# Patient Record
Sex: MALE | Race: WHITE | NOT HISPANIC OR LATINO | Employment: OTHER | URBAN - METROPOLITAN AREA
[De-identification: names, ages, dates, MRNs, and addresses within clinical notes are randomized per-mention and may not be internally consistent; named-entity substitution may affect disease eponyms.]

---

## 2021-07-07 ENCOUNTER — OFFICE VISIT (OUTPATIENT)
Dept: URGENT CARE | Facility: URGENT CARE | Age: 86
End: 2021-07-07
Payer: MEDICARE

## 2021-07-07 VITALS
HEART RATE: 46 BPM | TEMPERATURE: 99.6 F | RESPIRATION RATE: 16 BRPM | WEIGHT: 171 LBS | SYSTOLIC BLOOD PRESSURE: 152 MMHG | OXYGEN SATURATION: 97 % | DIASTOLIC BLOOD PRESSURE: 64 MMHG

## 2021-07-07 DIAGNOSIS — L03.032 PARONYCHIA OF TOE, LEFT: Primary | ICD-10-CM

## 2021-07-07 PROCEDURE — 99213 OFFICE O/P EST LOW 20 MIN: CPT | Mod: 25 | Performed by: PHYSICIAN ASSISTANT

## 2021-07-07 PROCEDURE — 11719 TRIM NAIL(S) ANY NUMBER: CPT | Mod: TA | Performed by: PHYSICIAN ASSISTANT

## 2021-07-07 RX ORDER — BIMATOPROST 0.1 MG/ML
1 SOLUTION/ DROPS OPHTHALMIC AT BEDTIME
COMMUNITY
Start: 2020-12-30

## 2021-07-07 RX ORDER — DORZOLAMIDE HYDROCHLORIDE AND TIMOLOL MALEATE 20; 5 MG/ML; MG/ML
1 SOLUTION/ DROPS OPHTHALMIC 2 TIMES DAILY
COMMUNITY
Start: 2021-01-15

## 2021-07-07 RX ORDER — ROSUVASTATIN CALCIUM 10 MG/1
10 TABLET, COATED ORAL DAILY
COMMUNITY
Start: 2021-03-26 | End: 2022-11-03

## 2021-07-07 RX ORDER — CHLORAL HYDRATE 500 MG
2 CAPSULE ORAL
COMMUNITY
End: 2022-11-03

## 2021-07-07 RX ORDER — AMLODIPINE BESYLATE 5 MG/1
5 TABLET ORAL DAILY
COMMUNITY
Start: 2021-03-26

## 2021-07-07 RX ORDER — BIMATOPROST 0.3 MG/ML
1 SOLUTION/ DROPS OPHTHALMIC
COMMUNITY
Start: 2021-01-15 | End: 2022-11-03

## 2021-07-07 RX ORDER — KETOROLAC TROMETHAMINE 5 MG/ML
SOLUTION OPHTHALMIC
COMMUNITY
Start: 2021-06-17 | End: 2022-11-03

## 2021-07-07 RX ORDER — SULFAMETHOXAZOLE/TRIMETHOPRIM 800-160 MG
1 TABLET ORAL 2 TIMES DAILY
Qty: 14 TABLET | Refills: 0 | Status: SHIPPED | OUTPATIENT
Start: 2021-07-07 | End: 2021-07-14

## 2021-07-07 NOTE — PATIENT INSTRUCTIONS
(L03.032) Paronychia of toe, left  (primary encounter diagnosis)  Comment:   Plan: sulfamethoxazole-trimethoprim (BACTRIM DS)         800-160 MG tablet          Soak foot for 20 minutes in warm soapy water (use only 3-5 drops of the surgical soap provided)  Once daily.  Apply bacitracin and NON stick dressing.      Follow up with PODIATRY for re-check in 2 weeks, sooner should symptoms persist or worsen.        Patient Education     Paronychia of the Finger or Toe  Paronychia is an infection near a fingernail or toenail. It usually occurs when an opening in the cuticle or an ingrown toenail lets bacteria under the skin.   The infection will need to be drained if pus is present. If the infection has been caught early, you may need only antibiotic treatment. Healing will take about 1 to 2 weeks.   Home care  Follow these guidelines when caring for yourself at home:     Clean and soak the toe or finger. Do this 2 times a day for the first 3 days. To do so:  ? Soak your foot or hand in a tub of warm water for 5 minutes. Or hold your toe or finger under a faucet of warm running water for 5 minutes.  ? Clean any crust away with soap and water using a cotton swab.  ? Put antibiotic ointment on the infected area.    Change the dressing daily or any time it gets dirty.    If you were given antibiotics, take them as directed until they are all gone.    If your infection is on a toe, wear comfortable shoes with a lot of toe room. You can also wear open-toed sandals while your toe heals.    You may use over-the-counter medicine (acetaminophen or ibuprofen) to help with pain, unless another medicine was prescribed. If you have chronic liver or kidney disease, talk with your healthcare provider before using these medicines. Also talk with your provider if you've had a stomach ulcer or gastrointestinal bleeding.  Prevention  The following can prevent paronychia:     Don't cut or play with your cuticles at home. A healthy cuticle  maintains a seal between your skin and nail and keeps out infection.    Don't bite your nails.    Don't suck on your thumbs or fingers.  Follow-up care  Follow up with your healthcare provider, or as advised.   When to seek medical advice  Call your healthcare provider right away if any of these occur:     Redness, pain, or swelling of the finger or toe gets worse    You have trouble moving or bending the finger or toe    Red streaks in the skin leading away from the wound    Pus or fluid draining from the nail area    Fever of 100.4 F (38 C) or higher, or as directed by your provider  Thomas last reviewed this educational content on 7/1/2019 2000-2021 [employer]. All rights reserved. This information is not intended as a substitute for professional medical care. Always follow your healthcare professional's instructions.

## 2021-07-07 NOTE — PROGRESS NOTES
Patient presents with:  Toe Pain: Left big toe pain for 2-3 weeks. Feels like something is under the toe.     (L03.032) Paronychia of toe, left  (primary encounter diagnosis)  Comment:   Plan: sulfamethoxazole-trimethoprim (BACTRIM DS)         800-160 MG tablet          Soak foot for 20 minutes in warm soapy water (use only 3-5 drops of the surgical soap provided)  Once daily.  Apply bacitracin and NON stick dressing.      Follow up with PODIATRY for re-check in 2 weeks, sooner should symptoms persist or worsen.      35 minutes spent on charting, reviewing chart, procedure and discussing with patient          SUBJECTIVE:   Suleiman Spain is a 89 year old male who presents with pain at the medial aspect of his left great toenail for the past couple of weeks.  No trauma.  No known fevers.    Area is warm and red however and tender to touch.    Has nails cut at salon.      Past Medical History:   Diagnosis Date     Cervicalgia 9/24/2009         Current Outpatient Medications   Medication Sig Dispense Refill     Multiple Vitamins-Iron (DAILY-WILFREDO/IRON/BETA-CAROTENE) TABS TAKE 1 TABLET BY MOUTH DAILY. (Patient not taking: Reported on 10/19/2020) 30 tablet 7     Social History     Tobacco Use     Smoking status: Never Smoker     Smokeless tobacco: Never Used   Substance Use Topics     Alcohol use: Not on file     Family History   Problem Relation Age of Onset     Diabetes Mother      Diabetes Father          ROS:    10 point ROS of systems including Constitutional, Eyes, Respiratory, Cardiovascular, Gastroenterology, Genitourinary, Integumentary, Muscularskeletal, Psychiatric ,neurological were all negative except for pertinent positives noted in my HPI       OBJECTIVE:  BP (!) 152/64   Pulse (!) 46   Temp 99.6  F (37.6  C) (Oral)   Resp 16   Wt 77.6 kg (171 lb)   SpO2 97%   Physical Exam:  GENERAL APPEARANCE: healthy, alert and no distress  NEURO: Normal strength and tone, sensory exam grossly normal,  normal  speech and mentation  SKIN: erythema at medial aspect of great toe at medial aspect of nail.  Nail is growing slightly into skin.  No drainage.    Left great toe: no bony tenderness    PROCEDURE: foot is soaked in  surgiclense soak, cuticle was pulled back and the medial aspect of the nail was lifted and trimmed.  English anvil was used to elevate and remove ingrown nail piece.    Bacitracin and non stick dressing applied.

## 2022-11-03 ENCOUNTER — APPOINTMENT (OUTPATIENT)
Dept: CT IMAGING | Facility: CLINIC | Age: 87
End: 2022-11-03
Attending: EMERGENCY MEDICINE
Payer: MEDICARE

## 2022-11-03 ENCOUNTER — HOSPITAL ENCOUNTER (OUTPATIENT)
Facility: CLINIC | Age: 87
Setting detail: OBSERVATION
Discharge: HOME | End: 2022-11-04
Attending: PHYSICIAN ASSISTANT | Admitting: HOSPITALIST
Payer: MEDICARE

## 2022-11-03 DIAGNOSIS — R93.0 ABNORMAL HEAD CT: ICD-10-CM

## 2022-11-03 DIAGNOSIS — M25.552 HIP PAIN, LEFT: ICD-10-CM

## 2022-11-03 DIAGNOSIS — R53.81 PHYSICAL DECONDITIONING: Primary | ICD-10-CM

## 2022-11-03 DIAGNOSIS — S09.90XA CLOSED HEAD INJURY, INITIAL ENCOUNTER: ICD-10-CM

## 2022-11-03 LAB
ANION GAP SERPL CALCULATED.3IONS-SCNC: 6 MMOL/L (ref 3–14)
ATRIAL RATE - MUSE: 52 BPM
BASOPHILS # BLD AUTO: 0.1 10E3/UL (ref 0–0.2)
BASOPHILS NFR BLD AUTO: 1 %
BUN SERPL-MCNC: 27 MG/DL (ref 7–30)
CALCIUM SERPL-MCNC: 8.9 MG/DL (ref 8.5–10.1)
CHLORIDE BLD-SCNC: 110 MMOL/L (ref 94–109)
CO2 SERPL-SCNC: 24 MMOL/L (ref 20–32)
CREAT SERPL-MCNC: 0.92 MG/DL (ref 0.66–1.25)
DIASTOLIC BLOOD PRESSURE - MUSE: NORMAL MMHG
EOSINOPHIL # BLD AUTO: 0.2 10E3/UL (ref 0–0.7)
EOSINOPHIL NFR BLD AUTO: 2 %
ERYTHROCYTE [DISTWIDTH] IN BLOOD BY AUTOMATED COUNT: 13.1 % (ref 10–15)
GFR SERPL CREATININE-BSD FRML MDRD: 79 ML/MIN/1.73M2
GLUCOSE BLD-MCNC: 106 MG/DL (ref 70–99)
HCT VFR BLD AUTO: 46.5 % (ref 40–53)
HGB BLD-MCNC: 15.1 G/DL (ref 13.3–17.7)
IMM GRANULOCYTES # BLD: 0 10E3/UL
IMM GRANULOCYTES NFR BLD: 0 %
INR PPP: 1.05 (ref 0.85–1.15)
INTERPRETATION ECG - MUSE: NORMAL
LYMPHOCYTES # BLD AUTO: 1.8 10E3/UL (ref 0.8–5.3)
LYMPHOCYTES NFR BLD AUTO: 19 %
MCH RBC QN AUTO: 30 PG (ref 26.5–33)
MCHC RBC AUTO-ENTMCNC: 32.5 G/DL (ref 31.5–36.5)
MCV RBC AUTO: 92 FL (ref 78–100)
MONOCYTES # BLD AUTO: 1 10E3/UL (ref 0–1.3)
MONOCYTES NFR BLD AUTO: 10 %
NEUTROPHILS # BLD AUTO: 6.4 10E3/UL (ref 1.6–8.3)
NEUTROPHILS NFR BLD AUTO: 68 %
NRBC # BLD AUTO: 0 10E3/UL
NRBC BLD AUTO-RTO: 0 /100
P AXIS - MUSE: 43 DEGREES
PLATELET # BLD AUTO: 222 10E3/UL (ref 150–450)
POTASSIUM BLD-SCNC: 3.8 MMOL/L (ref 3.4–5.3)
PR INTERVAL - MUSE: 174 MS
QRS DURATION - MUSE: 94 MS
QT - MUSE: 438 MS
QTC - MUSE: 407 MS
R AXIS - MUSE: -5 DEGREES
RBC # BLD AUTO: 5.04 10E6/UL (ref 4.4–5.9)
SARS-COV-2 RNA RESP QL NAA+PROBE: NEGATIVE
SODIUM SERPL-SCNC: 140 MMOL/L (ref 133–144)
SYSTOLIC BLOOD PRESSURE - MUSE: NORMAL MMHG
T AXIS - MUSE: 17 DEGREES
VENTRICULAR RATE- MUSE: 52 BPM
WBC # BLD AUTO: 9.5 10E3/UL (ref 4–11)

## 2022-11-03 PROCEDURE — 99220 PR INITIAL OBSERVATION CARE,LEVEL III: CPT | Performed by: HOSPITALIST

## 2022-11-03 PROCEDURE — U0003 INFECTIOUS AGENT DETECTION BY NUCLEIC ACID (DNA OR RNA); SEVERE ACUTE RESPIRATORY SYNDROME CORONAVIRUS 2 (SARS-COV-2) (CORONAVIRUS DISEASE [COVID-19]), AMPLIFIED PROBE TECHNIQUE, MAKING USE OF HIGH THROUGHPUT TECHNOLOGIES AS DESCRIBED BY CMS-2020-01-R: HCPCS | Performed by: PHYSICIAN ASSISTANT

## 2022-11-03 PROCEDURE — G0378 HOSPITAL OBSERVATION PER HR: HCPCS

## 2022-11-03 PROCEDURE — 80048 BASIC METABOLIC PNL TOTAL CA: CPT | Performed by: EMERGENCY MEDICINE

## 2022-11-03 PROCEDURE — 72192 CT PELVIS W/O DYE: CPT | Mod: MA

## 2022-11-03 PROCEDURE — 85610 PROTHROMBIN TIME: CPT | Performed by: EMERGENCY MEDICINE

## 2022-11-03 PROCEDURE — 96374 THER/PROPH/DIAG INJ IV PUSH: CPT

## 2022-11-03 PROCEDURE — 36415 COLL VENOUS BLD VENIPUNCTURE: CPT | Performed by: EMERGENCY MEDICINE

## 2022-11-03 PROCEDURE — C9803 HOPD COVID-19 SPEC COLLECT: HCPCS

## 2022-11-03 PROCEDURE — 99285 EMERGENCY DEPT VISIT HI MDM: CPT | Mod: 25

## 2022-11-03 PROCEDURE — 85025 COMPLETE CBC W/AUTO DIFF WBC: CPT | Performed by: EMERGENCY MEDICINE

## 2022-11-03 PROCEDURE — 93005 ELECTROCARDIOGRAM TRACING: CPT

## 2022-11-03 PROCEDURE — 70450 CT HEAD/BRAIN W/O DYE: CPT | Mod: MA

## 2022-11-03 PROCEDURE — 250N000013 HC RX MED GY IP 250 OP 250 PS 637: Performed by: PHYSICIAN ASSISTANT

## 2022-11-03 PROCEDURE — 96376 TX/PRO/DX INJ SAME DRUG ADON: CPT

## 2022-11-03 PROCEDURE — 250N000011 HC RX IP 250 OP 636: Performed by: PHYSICIAN ASSISTANT

## 2022-11-03 RX ORDER — ROSUVASTATIN CALCIUM 10 MG/1
10 TABLET, COATED ORAL DAILY
COMMUNITY

## 2022-11-03 RX ORDER — ACETAMINOPHEN 325 MG/1
650 TABLET ORAL EVERY 6 HOURS PRN
Status: DISCONTINUED | OUTPATIENT
Start: 2022-11-03 | End: 2022-11-04 | Stop reason: HOSPADM

## 2022-11-03 RX ORDER — NETARSUDIL 0.2 MG/ML
1 SOLUTION/ DROPS OPHTHALMIC; TOPICAL AT BEDTIME
COMMUNITY

## 2022-11-03 RX ORDER — DORZOLAMIDE HYDROCHLORIDE AND TIMOLOL MALEATE 20; 5 MG/ML; MG/ML
1 SOLUTION/ DROPS OPHTHALMIC 2 TIMES DAILY
Status: DISCONTINUED | OUTPATIENT
Start: 2022-11-03 | End: 2022-11-04 | Stop reason: HOSPADM

## 2022-11-03 RX ORDER — LIDOCAINE 40 MG/G
CREAM TOPICAL
Status: DISCONTINUED | OUTPATIENT
Start: 2022-11-03 | End: 2022-11-04 | Stop reason: HOSPADM

## 2022-11-03 RX ORDER — ONDANSETRON 4 MG/1
4 TABLET, ORALLY DISINTEGRATING ORAL EVERY 6 HOURS PRN
Status: DISCONTINUED | OUTPATIENT
Start: 2022-11-03 | End: 2022-11-04 | Stop reason: HOSPADM

## 2022-11-03 RX ORDER — ACETAMINOPHEN 650 MG/1
650 SUPPOSITORY RECTAL EVERY 6 HOURS PRN
Status: DISCONTINUED | OUTPATIENT
Start: 2022-11-03 | End: 2022-11-04 | Stop reason: HOSPADM

## 2022-11-03 RX ORDER — HYDROMORPHONE HYDROCHLORIDE 1 MG/ML
0.3 INJECTION, SOLUTION INTRAMUSCULAR; INTRAVENOUS; SUBCUTANEOUS ONCE
Status: COMPLETED | OUTPATIENT
Start: 2022-11-03 | End: 2022-11-03

## 2022-11-03 RX ORDER — HYDROMORPHONE HYDROCHLORIDE 1 MG/ML
0.2 INJECTION, SOLUTION INTRAMUSCULAR; INTRAVENOUS; SUBCUTANEOUS
Status: DISCONTINUED | OUTPATIENT
Start: 2022-11-03 | End: 2022-11-04 | Stop reason: HOSPADM

## 2022-11-03 RX ORDER — HYDROMORPHONE HYDROCHLORIDE 1 MG/ML
0.2 INJECTION, SOLUTION INTRAMUSCULAR; INTRAVENOUS; SUBCUTANEOUS ONCE
Status: COMPLETED | OUTPATIENT
Start: 2022-11-03 | End: 2022-11-03

## 2022-11-03 RX ORDER — ROSUVASTATIN CALCIUM 10 MG/1
10 TABLET, COATED ORAL DAILY
Status: DISCONTINUED | OUTPATIENT
Start: 2022-11-04 | End: 2022-11-04 | Stop reason: HOSPADM

## 2022-11-03 RX ORDER — ONDANSETRON 2 MG/ML
4 INJECTION INTRAMUSCULAR; INTRAVENOUS EVERY 6 HOURS PRN
Status: DISCONTINUED | OUTPATIENT
Start: 2022-11-03 | End: 2022-11-04 | Stop reason: HOSPADM

## 2022-11-03 RX ORDER — ACETAMINOPHEN 500 MG
1000 TABLET ORAL ONCE
Status: COMPLETED | OUTPATIENT
Start: 2022-11-03 | End: 2022-11-03

## 2022-11-03 RX ORDER — AMLODIPINE BESYLATE 5 MG/1
5 TABLET ORAL DAILY
Status: DISCONTINUED | OUTPATIENT
Start: 2022-11-04 | End: 2022-11-04 | Stop reason: HOSPADM

## 2022-11-03 RX ADMIN — ACETAMINOPHEN 1000 MG: 500 TABLET ORAL at 20:23

## 2022-11-03 RX ADMIN — HYDROMORPHONE HYDROCHLORIDE 0.2 MG: 1 INJECTION, SOLUTION INTRAMUSCULAR; INTRAVENOUS; SUBCUTANEOUS at 22:26

## 2022-11-03 RX ADMIN — HYDROMORPHONE HYDROCHLORIDE 0.3 MG: 1 INJECTION, SOLUTION INTRAMUSCULAR; INTRAVENOUS; SUBCUTANEOUS at 19:27

## 2022-11-03 ASSESSMENT — ENCOUNTER SYMPTOMS
JOINT SWELLING: 1
HEADACHES: 1

## 2022-11-03 ASSESSMENT — ACTIVITIES OF DAILY LIVING (ADL)
ADLS_ACUITY_SCORE: 35
ADLS_ACUITY_SCORE: 35

## 2022-11-03 NOTE — ED TRIAGE NOTES
Pt coming by ambulance from assisted living [facility] in [place], fell today and hit head, no loc, no blood thinners. Pt states he tripped. Pain to lt hip   Triage Assessment     Row Name 11/03/22 5117       Triage Assessment (Adult)    Airway WDL WDL       Respiratory WDL    Respiratory WDL WDL       Skin Circulation/Temperature WDL    Skin Circulation/Temperature WDL WDL       Cardiac WDL    Cardiac WDL WDL       Cognitive/Neuro/Behavioral WDL    Cognitive/Neuro/Behavioral WDL WDL

## 2022-11-03 NOTE — ED NOTES
Rapid Assessment Note    History:   Suleiman Spain is a 89 year old male who presents after a fall at his assisted living around 4:30PM where he hit his head and left hip. Now in the ED, he reports that he is only experiencing left hip pain. Denies head pain, headache, neck pain, or loss of consciousness.    Exam:   General:  Alert, interactive  Cardiovascular:  Well perfused  Lungs:  No respiratory distress, no accessory muscle use  Neuro:  Moving all 4 extremities  Skin:  Warm, dry  Psych:  Normal affect  Musculoskeletal: tenderness of left greater trochanter and left posterior pelvis.    Plan of Care:   I evaluated the patient and developed an initial plan of care. I discussed this plan and explained that I, or one of my partners, would be returning to complete the evaluation.     I, Harshal Beltran, am serving as a scribe to document services personally performed by Disha Boggs MD, based on my observations and the provider's statements to me.    11/3/2022  [employer]    Portions of this medical record were completed by a scribe. UPON MY REVIEW AND AUTHENTICATION BY ELECTRONIC SIGNATURE, this confirms (a) I performed the applicable clinical services, and (b) the record is accurate.

## 2022-11-04 ENCOUNTER — APPOINTMENT (OUTPATIENT)
Dept: PHYSICAL THERAPY | Facility: CLINIC | Age: 87
End: 2022-11-04
Attending: HOSPITALIST
Payer: MEDICARE

## 2022-11-04 VITALS
BODY MASS INDEX: 25.63 KG/M2 | HEART RATE: 52 BPM | DIASTOLIC BLOOD PRESSURE: 71 MMHG | HEIGHT: 67 IN | TEMPERATURE: 97.6 F | OXYGEN SATURATION: 97 % | WEIGHT: 163.3 LBS | SYSTOLIC BLOOD PRESSURE: 149 MMHG | RESPIRATION RATE: 16 BRPM

## 2022-11-04 PROCEDURE — 250N000013 HC RX MED GY IP 250 OP 250 PS 637: Performed by: HOSPITALIST

## 2022-11-04 PROCEDURE — G0378 HOSPITAL OBSERVATION PER HR: HCPCS

## 2022-11-04 PROCEDURE — 97116 GAIT TRAINING THERAPY: CPT | Mod: GP | Performed by: PHYSICAL THERAPIST

## 2022-11-04 PROCEDURE — 97530 THERAPEUTIC ACTIVITIES: CPT | Mod: GP | Performed by: PHYSICAL THERAPIST

## 2022-11-04 PROCEDURE — 97161 PT EVAL LOW COMPLEX 20 MIN: CPT | Mod: GP | Performed by: PHYSICAL THERAPIST

## 2022-11-04 PROCEDURE — 99217 PR OBSERVATION CARE DISCHARGE: CPT | Performed by: INTERNAL MEDICINE

## 2022-11-04 RX ORDER — NALOXONE HYDROCHLORIDE 0.4 MG/ML
0.2 INJECTION, SOLUTION INTRAMUSCULAR; INTRAVENOUS; SUBCUTANEOUS
Status: DISCONTINUED | OUTPATIENT
Start: 2022-11-04 | End: 2022-11-04 | Stop reason: HOSPADM

## 2022-11-04 RX ORDER — NALOXONE HYDROCHLORIDE 0.4 MG/ML
0.4 INJECTION, SOLUTION INTRAMUSCULAR; INTRAVENOUS; SUBCUTANEOUS
Status: DISCONTINUED | OUTPATIENT
Start: 2022-11-04 | End: 2022-11-04 | Stop reason: HOSPADM

## 2022-11-04 RX ORDER — ACETAMINOPHEN 500 MG
500-1000 TABLET ORAL EVERY 6 HOURS PRN
Status: ON HOLD | COMMUNITY
End: 2022-11-04

## 2022-11-04 RX ORDER — ACETAMINOPHEN 500 MG
1000 TABLET ORAL EVERY 6 HOURS PRN
COMMUNITY
Start: 2022-11-04

## 2022-11-04 RX ADMIN — ROSUVASTATIN 10 MG: 10 TABLET, FILM COATED ORAL at 09:22

## 2022-11-04 RX ADMIN — AMLODIPINE BESYLATE 5 MG: 5 TABLET ORAL at 09:22

## 2022-11-04 RX ADMIN — DORZOLAMIDE HYDROCHLORIDE AND TIMOLOL MALEATE 1 DROP: 20; 5 SOLUTION/ DROPS OPHTHALMIC at 09:23

## 2022-11-04 RX ADMIN — DORZOLAMIDE HYDROCHLORIDE AND TIMOLOL MALEATE 1 DROP: 20; 5 SOLUTION/ DROPS OPHTHALMIC at 00:54

## 2022-11-04 RX ADMIN — OXYCODONE HYDROCHLORIDE 2.5 MG: 5 TABLET ORAL at 00:55

## 2022-11-04 ASSESSMENT — ACTIVITIES OF DAILY LIVING (ADL)
ADLS_ACUITY_SCORE: 35
DEPENDENT_IADLS:: TRANSPORTATION;MONEY MANAGEMENT
ADLS_ACUITY_SCORE: 35

## 2022-11-04 NOTE — ED NOTES
[facility]  ED Nurse Handoff Report    ED Chief complaint: Fall, Head Injury, and Hip Pain      ED Diagnosis:   Final diagnoses:   Hip pain, left   Closed head injury, initial encounter   Abnormal head CT       Code Status: Not addressed in ED    Allergies:   Allergies   Allergen Reactions     Aspirin Buffered Unknown and GI Disturbance     Brimonidine Other (See Comments)     stomach ache, head ache, sore muscles  Eye burning       Ibuprofen Unknown     Stomach ulcers and bleeding       Erythromycin Rash     Penicillins Rash       Patient Story: Pt from assisted living, fell today. Unable to ambulate d/t left hip pain. CT shows no fractures. Pt is extremely hard of hearing, even with hearing aids.  Focused Assessment:    Labs Ordered and Resulted from Time of ED Arrival to Time of ED Departure   BASIC METABOLIC PANEL - Abnormal       Result Value    Sodium 140      Potassium 3.8      Chloride 110 (*)     Carbon Dioxide (CO2) 24      Anion Gap 6      Urea Nitrogen 27      Creatinine 0.92      Calcium 8.9      Glucose 106 (*)     GFR Estimate 79     INR - Normal    INR 1.05     COVID-19 VIRUS (CORONAVIRUS) BY PCR - Normal    SARS CoV2 PCR Negative     CBC WITH PLATELETS AND DIFFERENTIAL    WBC Count 9.5      RBC Count 5.04      Hemoglobin 15.1      Hematocrit 46.5      MCV 92      MCH 30.0      MCHC 32.5      RDW 13.1      Platelet Count 222      % Neutrophils 68      % Lymphocytes 19      % Monocytes 10      % Eosinophils 2      % Basophils 1      % Immature Granulocytes 0      NRBCs per 100 WBC 0      Absolute Neutrophils 6.4      Absolute Lymphocytes 1.8      Absolute Monocytes 1.0      Absolute Eosinophils 0.2      Absolute Basophils 0.1      Absolute Immature Granulocytes 0.0      Absolute NRBCs 0.0       CT Pelvis Bone wo Contrast   Final Result   IMPRESSION:   1.  Both hips negative for fracture or CT evidence of avascular necrosis.   2.  Degenerative change of both hips.   3.  Pelvis negative  "for fracture.   4.  Degenerative change at the SI joints and symphysis pubis.   5.  Hemorrhagic blood product within the left gluteal musculature without evidence for organized hematoma.   6.  Intrapelvic contents negative.      Head CT w/o contrast   Preliminary Result   IMPRESSION:   1.  No acute intracranial hemorrhage, extra-axial fluid collection, or mass effect/herniation.   2.  A few small nonspecific hypodense foci, including in the left corona radiata and superior right cerebral hemisphere, concerning for age-indeterminate infarcts. If clinically warranted, MRI may be helpful for further characterization.   3.  Generalized brain atrophy and presumed chronic small vessel ischemic change, as described.            Treatments and/or interventions provided: Tylenol and dilaudid  Patient's response to treatments and/or interventions: Pt resting comfortably at rest.    To be done/followed up on inpatient unit:  see orders    Does this patient have any cognitive concerns?: none noted    Activity level - Baseline/Home:  Independent  Activity Level - Current:   Unknown    Patient's Preferred language: English   Needed?: No    Isolation: None  Infection: Not Applicable  Patient tested for COVID 19 prior to admission: YES  Bariatric?: No    Vital Signs:   Vitals:    11/03/22 1820   BP: 139/60   Pulse: 69   Resp: 20   Temp: 97.9  F (36.6  C)   SpO2: 97%   Weight: 72.6 kg (160 lb)   Height: 1.702 m (5' 7\")       Cardiac Rhythm:     Was the PSS-3 completed:   Yes  What interventions are required if any?               Family Comments: daughter  OBS brochure/video discussed/provided to patient/family: No              Name of person given brochure if not patient:               Relationship to patient:     For the majority of the shift this patient's behavior was Green.   Behavioral interventions performed were none.    ED NURSE PHONE NUMBER: *[phone]         "

## 2022-11-04 NOTE — H&P
[facility]    History and Physical - Hospitalist Service       Date of Admission:  11/3/2022    Assessment & Plan      Suleiman Spain is a 89 year old male with a history of hypertension, hyperlipidemia, diabetes mellitus, GERD, peripheral neuropathy, and stroke who presented to the ER following a fall.  After evaluation in the ER the hospitalist service was contacted to bring him into the hospital for further evaluation and management.    Mechanical fall  Left hip/buttock pain  Tripped and fell at his independent living facility.  Landed on his left hip and hit the left side of his head on a table.  He did not try to get up.  EMS was called and brought him into the ER for evaluation.    [place] to observation.    CT head shows no acute intracranial abnormalities.  Low clinical suspicion that the nonspecific hypodense foci concerning for age-indeterminate infarcts are acute.    CT pelvis negative for acute fracture but does show some hemorrhagic blood products within the left gluteal musculature without evidence for organized hematoma.    Symptomatic management.    PT consult.    Care transitions consult.  Patient's daughter is hopeful that the patient will be able to go back to independent living.  She is interested in learning about ways to increase services or hire additional help to help facilitate this.    Hypertension    Continue PTA amlodipine.    Hyperlipidemia    Continue PTA rosuvastatin.    Diabetes mellitus  Peripheral neuropathy    Diet controlled.    GERD    Not currently on any treatment for this.    Presbycusis    He is very hard of hearing, even with his hearing aids in.    History of stroke    Left thalamus lacunar infarct in June 2012.    COVID-19 PCR negative    Routine admission COVID-19 PCR testing was negative on 11/3/2022.       Diet:   regular diet  DVT Prophylaxis: PCDs  Linder Catheter: Not present  Central Lines: None  Cardiac Monitoring: None  Code Status:    "FULL CODE per discussion with his daughter    Clinically Significant Risk Factors Present on Admission                      # Overweight: Estimated body mass index is 25.06 kg/m  as calculated from the following:    Height as of this encounter: 1.702 m (5' 7\").    Weight as of this encounter: 72.6 kg (160 lb).           Disposition Plan      Expected Discharge Date: 11/04/2022                The patient's care was discussed with the Bedside Nurse, Patient, Patient's Family and ER provider.    Tadeo Mckeon MD  Hospitalist Service  [facility]  Securely message with the Vocera Web Console (learn more here)  Text page via [facility] Paging/Directory         ______________________________________________________________________    Chief Complaint   fall    History is obtained from the patient    History of Present Illness   Suleiman Spain is a 89 year old male with a history of hypertension, hyperlipidemia, diabetes mellitus, GERD, peripheral neuropathy, and stroke who presented to the ER following a fall.  He is very hard of hearing and it is somewhat difficult to get history from him.  He states that he turned and tripped and fell at his independent living facility today.  He landed on his left hip and hit his head on a table.  He denies losing consciousness.  He was unable to get up.  EMS was called and he was brought into the ER for evaluation.    In the ER he was evaluated by Matt Man PA-C.  Vitals were okay as documented in epic.  Labs are fairly unremarkable.  CT head showed no acute intracranial findings, did show findings of age-indeterminate infarcts.  CT pelvis was negative for fracture, did show hemorrhagic blood products within the left gluteal musculature without evidence for organized hematoma.  Due to the pain in his left buttock he was unable to ambulate.  Hospitalist service was contacted to bring him into the hospital for further management.    He states he basically " just tripped and fell.  Denies any symptoms preceding the fall.  No lightheadedness, chest pain, shortness of breath, nausea.  He denies any recent acute medical issues.  No changes to his medications.  No sick contacts.    I have updated his daughter Marvin by phone today.  She states he fell once a couple years ago but has not had any recent falls.  Patient lives with his wife in independent living.  The patient is in favor of moving to assisted living however his wife is quite insistent about staying in independent living.  Patient's wife has dementia and the patient helps her with her medications.    Review of Systems    The 10 point Review of Systems is negative other than noted in the HPI or here.    Past Medical History    I have reviewed this patient's medical history and updated it with pertinent information if needed.   Past Medical History:   Diagnosis Date     Benign essential hypertension      Cervicalgia 09/24/2009     Diabetes mellitus (H)      GERD (gastroesophageal reflux disease)      Hyperlipidemia      Peripheral neuropathy      Presbycusis      Stroke (H)      Past Surgical History   I have reviewed this patient's surgical history and updated it with pertinent information if needed.  Past Surgical History:   Procedure Laterality Date     CATARACT EXTRACTION       LASER SURGERY OF EYE Left     SLT     SCLERAL FISTULIZATION Left      TONSILLECTOMY       Social History   I have reviewed this patient's social history and updated it with pertinent information if needed.  Social History     Tobacco Use     Smoking status: Former     Types: Cigarettes     Smokeless tobacco: Never   Substance Use Topics     Alcohol use: Yes     Family History     No significant family history, including no history of: cataract, glaucoma, or macular degeneration.    Prior to Admission Medications   Prior to Admission Medications   Prescriptions Last Dose Informant Patient Reported? Taking?   amLODIPine (NORVASC) 5 MG  tablet 11/3/2022 at am  Yes Yes   Sig: Take 5 mg by mouth daily   bimatoprost (LUMIGAN) 0.01 % SOLN 11/2/2022 at hs  Yes Yes   Sig: Place 1 drop Into the left eye At Bedtime   dorzolamide-timolol (COSOPT) 2-0.5 % ophthalmic solution 11/3/2022 at am  Yes Yes   Sig: Place 1 drop into both eyes 2 times daily   netarsudil (RHOPRESSA) 0.02 % ophthalmic solution 11/2/2022 at hs  Yes Yes   Sig: Place 1 drop into the right eye At Bedtime   rosuvastatin (CRESTOR) 10 MG tablet 11/3/2022 at am  Yes Yes   Sig: Take 10 mg by mouth daily      Facility-Administered Medications: None     Allergies   Allergies   Allergen Reactions     Aspirin Buffered Unknown and GI Disturbance     Brimonidine Other (See Comments)     stomach ache, head ache, sore muscles  Eye burning       Ibuprofen Unknown     Stomach ulcers and bleeding       Erythromycin Rash     Penicillins Rash     Physical Exam   Vital Signs: Temp: 97.9  F (36.6  C)   BP: 139/60 Pulse: 69   Resp: 20 SpO2: 97 %      Weight: 160 lbs 0 oz    Constitutional: awake, alert, cooperative, no apparent distress, laying in the ER bed, very hard of hearing  Eyes: sclera clear, conjunctiva normal  ENT: oral pharynx with moist mucous membranes  Respiratory: no increased work of breathing, clear to auscultation bilaterally, no crackles or wheezing  Cardiovascular: regular rate and rhythm, normal S1 and S2, no murmur noted  GI: normal bowel sounds, soft, non-distended, non-tender  Skin: warm, dry  Musculoskeletal: no lower extremity pitting edema present, left buttock tender to palpation but no ecchymosis or fluctuance  Neurologic: awake, alert, very hard of hearing, answers questions appropriately when he can hear the questions, moves all extremities, left lower extremity mobility limited by left buttock pain    Data   Data reviewed today: I reviewed all medications, new labs and imaging results over the last 24 hours. I personally reviewed no images or EKG's today.    Recent Labs   Lab  11/03/22  1832   WBC 9.5   HGB 15.1   MCV 92      INR 1.05      POTASSIUM 3.8   CHLORIDE 110*   CO2 24   BUN 27   CR 0.92   ANIONGAP 6   SMILEY 8.9   *     Recent Results (from the past 24 hour(s))   Head CT w/o contrast    Narrative    EXAM: CT HEAD W/O CONTRAST  LOCATION: [facility]  DATE/TIME: 11/3/2022 7:54 PM    INDICATION: Trauma. Check for traumatic bleed or skull fracture, pain after fall.  COMPARISON: MRI brain 6/7/2012.  TECHNIQUE: Routine CT Head without IV contrast. Multiplanar reformats. Dose reduction techniques were used.    FINDINGS:  INTRACRANIAL CONTENTS: The ventricles are normal in size and configuration. New small focus of hypodensity in the left corona radiata (series 3 image 19) concerning for age-indeterminate infarct. There also appears to be a couple of new small foci of   hypoattenuation in the superior right cerebellar hemisphere (series 3 image 11) concerning for possible age-indeterminate infarcts. Small focus of hypoattenuation in the inferior aspect of the right lentiform nucleus may represent a previously seen   mildly prominent dilated perivascular space versus a small age-indeterminate infarct. Elsewhere, mild to moderate scattered patchy nonspecific hypoattenuation in the cerebral white matter, probably due to chronic small vessel ischemic disease. Moderate   generalized brain parenchymal volume loss.    VISUALIZED ORBITS/SINUSES/MASTOIDS: Prior bilateral cataract surgery. Visualized portions of the orbits are otherwise unremarkable. No paranasal sinus mucosal disease. No middle ear or mastoid effusion.    BONES/SOFT TISSUES: No acute abnormality. No displaced calvarial fracture identified. Degenerative changes of the bilateral temporomandibular joints.      Impression    IMPRESSION:  1.  No acute intracranial hemorrhage, extra-axial fluid collection, or mass effect/herniation.  2.  A few small nonspecific hypodense foci, including in the  left corona radiata and superior right cerebral hemisphere, concerning for age-indeterminate infarcts. If clinically warranted, MRI may be helpful for further characterization.  3.  Generalized brain atrophy and presumed chronic small vessel ischemic change, as described.   CT Pelvis Bone wo Contrast    Narrative    EXAM: CT PELVIS BONE WO CONTRAST  LOCATION: [facility]  DATE/TIME: 11/3/2022 7:54 PM    INDICATION: Check for left hip or pelvic fracture, pain left hip after fall.  COMPARISON: None.  TECHNIQUE: CT scan of the pelvis was performed without IV contrast. Multiplanar reformats were obtained. Dose reduction techniques were used.  CONTRAST: None.    FINDINGS:    Both hips are negative for fracture or CT evidence of avascular necrosis. Degenerative change of both hip joints. The pelvis is negative for fracture. Degenerative change at the symphysis pubis and SI joints. Degenerative change lower lumbar spine.   Partial sacralization of a transitional lumbosacral vertebral body.    There is asymmetric enlargement of the left gluteal musculature suggestive of a component of hemorrhagic blood product without evidence for significant organized hematoma. The intrapelvic contents are negative for abnormal mass, free fluid, or   lymphadenopathy.      Impression    IMPRESSION:  1.  Both hips negative for fracture or CT evidence of avascular necrosis.  2.  Degenerative change of both hips.  3.  Pelvis negative for fracture.  4.  Degenerative change at the SI joints and symphysis pubis.  5.  Hemorrhagic blood product within the left gluteal musculature without evidence for organized hematoma.  6.  Intrapelvic contents negative.

## 2022-11-04 NOTE — PLAN OF CARE
Physical Therapy Discharge Summary    Reason for therapy discharge:    Discharged to home with home therapy.    Progress towards therapy goal(s). See goals on Care Plan in [facility] electronic health record for goal details.  Goals partially met.  Barriers to achieving goals:   discharge from facility.    Therapy recommendation(s):    Continued therapy is recommended.  Rationale/Recommendations:  Pt would benefit from continued skilled PT services via HHPT to address deficits and improve IND with safety and functional mobility.

## 2022-11-04 NOTE — PROGRESS NOTES
"   11/04/22 0800   Appointment Info   Signing Clinician's Name / Credentials (PT) Shahbaz Lindsey DPT       Present no   Living Environment   People in Home spouse   Current Living Arrangements independent living facility   Home Accessibility no concerns   Transportation Anticipated family or friend will provide   Living Environment Comments Pt lives in an ILF with his spouse. Per pt, pt's spouse has short-term memory loss and requires assist 24/7. Pt reports no concerns regarding stairs. Pt reports his daughter will pick him up upon discharge and provide assist if needed.   Self-Care   Usual Activity Tolerance good   Current Activity Tolerance moderate   Regular Exercise No   Equipment Currently Used at Home walker, rolling;cane, straight   Fall history within last six months yes   Number of times patient has fallen within last six months 1   Activity/Exercise/Self-Care Comment Pt reports being IND at baseline with all ADLs. Pt reports ambulating w/ a SEC at baseline but does have a FWW if needed. Pt reports being able to ambulate 1/4 mile at baseline. Pt does not drive.   General Information   Onset of Illness/Injury or Date of Surgery 11/04/22   Referring Physician Tadeo Mckeon MD   Patient/Family Therapy Goals Statement (PT) \"To go home\"   Pertinent History of Current Problem (include personal factors and/or comorbidities that impact the POC) Per Chart:   Existing Precautions/Restrictions fall   Weight-Bearing Status - LLE full weight-bearing   Weight-Bearing Status - RLE full weight-bearing   Cognition   Orientation Status (Cognition) oriented x 3   Pain Assessment   Patient Currently in Pain No   Integumentary/Edema   Integumentary/Edema Comments 1+ LLE edema   Posture    Posture Forward head position;Protracted shoulders   Range of Motion (ROM)   Range of Motion ROM is WFL   Strength (Manual Muscle Testing)   Strength (Manual Muscle Testing) Able to perform L SLR;Able to " perform R SLR   Bed Mobility   Comment, (Bed Mobility) Supine>sit w/ CGA   Transfers   Comment, (Transfers) Sit>stand w/ FWW and CGA   Gait/Stairs (Locomotion)   [place] Level (Gait) contact guard   Assistive Device (Gait) walker, front-wheeled   Distance in Feet (Required for LE Total Joints) 175'  (10' eval)   Distance in Feet (Gait) 175'   Comment, (Gait/Stairs) Pt ambulated ~10' w/ FWW and CGA   Balance   Balance Comments Pt able to sit at EOB unsupported without LOB. Pt ambulated w/ FWW and was steady.   Sensory Examination   Sensory Perception patient reports no sensory changes   Clinical Impression   Criteria for Skilled Therapeutic Intervention Yes, treatment indicated   PT Diagnosis (PT) Impaired gait   Influenced by the following impairments Decreased activity tolerance; decreased balance; decreased strength   Functional limitations due to impairments Impaired functional mobility   Clinical Presentation (PT Evaluation Complexity) Stable/Uncomplicated   Clinical Presentation Rationale Clinical Judgement   Clinical Decision Making (Complexity) low complexity   Planned Therapy Interventions (PT) balance training;bed mobility training;gait training;patient/family education;strengthening;transfer training   Risk & Benefits of therapy have been explained care plan/treatment goals reviewed;evaluation/treatment results reviewed;current/potential barriers reviewed;risks/benefits reviewed;participants voiced agreement with care plan;participants included;patient   PT Total Evaluation Time   PT Eval, Low Complexity Minutes (80880) 10   Physical Therapy Goals   PT Frequency 5x/week   PT Predicted Duration/Target Date for Goal Attainment 11/09/22   PT Goals Bed Mobility;Transfers;Gait   PT: Bed Mobility Supervision/stand-by assist;Supine to/from sit   PT: Transfers Supervision/stand-by assist;Sit to/from stand;Assistive device   PT: Gait Supervision/stand-by assist;Assistive device;150 feet   Interventions    Interventions Quick Adds Gait Training;Therapeutic Activity   Therapeutic Activity   Therapeutic Activities: dynamic activities to improve functional performance Minutes (35932) 13   Symptoms Noted During/After Treatment Fatigue;Increased pain   Treatment Detail/Skilled Intervention Greeted pt supine in bed, agreed to PT. VSS on RA throughout session. Pt performed supine>sit w/ CGA. Once in sitting, pt able to scoot self to EOB and sit unsupported without LOB. Pt complaining of L hip pain throughout session. Pt performed sit>stand x 5 w/ FWW and CGA, verbal cues for hand placement. After ambulation, pt returned to chair and performed stand>sit w/ FWW and CGA, verbal cues to descend in a slow, controlled motion. Pt ended session sitting in chair, with all needs met and call light within reach.   Gait Training   Gait Training Minutes (82176) 15   Symptoms Noted During/After Treatment (Gait Training) fatigue;increased pain   Treatment Detail/Skilled Intervention Pt ambulated w/ FWW and CGA. Pt ambulated with decreased gait speed, downward gaze, antalgic gait. heavy reliance on FWW, and steady. Verbal cues for upright gaze and posture, to reduce reliance on FWW, and pacing. PT introduced dynamic balance challenges during ambulation including head turns. Mild path deviations noted but no overt LOB noted. Discussed with pt, recommend pt ambulates with a FWW at discharge and pt in agreement.   PT Discharge Planning   PT Plan Progress gait w/ FWW vs SEC; dynamic balance   PT Discharge Recommendation (DC Rec) home with assist   PT Rationale for DC Rec Pt is below baseline but is moving well. Anticipate at time of discharge pt will be SBA for bed mobility, functional transfers, and gait w/ FWW. Pt reports his daughter can provide assist to pt and pt's spouse if needed. Per pt, pt also is planning on moving into [facility] in the future.   PT Brief overview of current status Supine>sit w/ CGA; sit>stand w/ FWW and CGA; gait w/ FWW  and CGA   Total Session Time   Timed Code Treatment Minutes 28   Total Session Time (sum of timed and untimed services) 38

## 2022-11-04 NOTE — ED PROVIDER NOTES
ED ATTENDING PHYSICIAN NOTE:   I evaluated this patient in conjunction with Matt Man PA-C  I have participated in the care of the patient and personally performed key elements of the history, exam, and medical decision making.      HPI:   Suleiman Spain is a 89 year old male presents with left hip pain after a reported fall.  He reports having a mechanical fall around 1630. He reports tripping and hitting his head as well as his left hip.  No headache, neck pain, chest/abdominal pain, nausea, vomiting, diarrhea or dysuria.  He complains predominately of hip pain predominately with movement/walking.  He took tylenol for pain relief. No history anticoagulation.      EXAM:   General: Alert. Appears uncomfortable. Hard of hearing  Head:  The scalp, face, and head appear normal without trauma  Eyes:  Sclera white; Pupils are equal and round  ENT:    External ears normal.      External nares normal.    Neck:  No midline tenderness or pain with full ROM.  CV:  Rate as above with regular rhythm   No murmur   2/2 radial and dorsal pedal pulses  Resp:  Breath sounds clear and equal bilaterally    Non-labored, no retractions or accessory muscle use  GI:  Abdomen soft, non-tender, non-distended    No rebound tenderness or guarding  MSK:  No midline tenderness or bony step-off    No deformity    Moves all extremities equally and symmetrically other than LLE, reproducible L. Hip/buttock pain, no overlying ecchymosis/erythema/warmth. No L. Knee or ankle tenderness  Skin:  No rash or lesions noted.  Neuro:   No apparent deficit.    Strength 5/5 in all extremities other than LLE. Sensation intact x4.     GCS: 15  Psych:  Normal affect.         CT Pelvis Bone wo Contrast   Final Result   IMPRESSION:   1.  Both hips negative for fracture or CT evidence of avascular necrosis.   2.  Degenerative change of both hips.   3.  Pelvis negative for fracture.   4.  Degenerative change at the SI joints and symphysis pubis.   5.  Hemorrhagic  blood product within the left gluteal musculature without evidence for organized hematoma.   6.  Intrapelvic contents negative.      Head CT w/o contrast   Preliminary Result   IMPRESSION:   1.  No acute intracranial hemorrhage, extra-axial fluid collection, or mass effect/herniation.   2.  A few small nonspecific hypodense foci, including in the left corona radiata and superior right cerebral hemisphere, concerning for age-indeterminate infarcts. If clinically warranted, MRI may be helpful for further characterization.   3.  Generalized brain atrophy and presumed chronic small vessel ischemic change, as described.        Labs Ordered and Resulted from Time of ED Arrival to Time of ED Departure   BASIC METABOLIC PANEL - Abnormal       Result Value    Sodium 140      Potassium 3.8      Chloride 110 (*)     Carbon Dioxide (CO2) 24      Anion Gap 6      Urea Nitrogen 27      Creatinine 0.92      Calcium 8.9      Glucose 106 (*)     GFR Estimate 79     INR - Normal    INR 1.05     CBC WITH PLATELETS AND DIFFERENTIAL    WBC Count 9.5      RBC Count 5.04      Hemoglobin 15.1      Hematocrit 46.5      MCV 92      MCH 30.0      MCHC 32.5      RDW 13.1      Platelet Count 222      % Neutrophils 68      % Lymphocytes 19      % Monocytes 10      % Eosinophils 2      % Basophils 1      % Immature Granulocytes 0      NRBCs per 100 WBC 0      Absolute Neutrophils 6.4      Absolute Lymphocytes 1.8      Absolute Monocytes 1.0      Absolute Eosinophils 0.2      Absolute Basophils 0.1      Absolute Immature Granulocytes 0.0      Absolute NRBCs 0.0     COVID-19 VIRUS (CORONAVIRUS) BY PCR       MEDICAL DECISION MAKING/ASSESSMENT AND PLAN:      Patient is a 89-year-old male presenting status post reported mechanical fall.  He predominately complains of left hip pain on my exam.  His work-up was initiated from triage given prolonged wait times.  CT head unremarkable though nonspecific hypodense foci noted.  He did undergo formal CT  pelvis which is without occult hip fracture though noted hemorrhagic blood products within the left gluteal musculature though no organized hematoma identified.  The remainder patient's trauma exam is unremarkable.  He remained hemodynamically stable though continued to have persistent pain to his left hip despite analgesia provided.  He is to benefit from pain control at this point in time.    DIAGNOSIS:     ICD-10-CM    1. Hip pain, left  M25.552       2. Closed head injury, initial encounter  S09.90XA       3. Abnormal head CT  R93.0           DISPOSITION:   Admitted to hospital       11/3/2022  [facility] EMERGENCY DEPT     Bee Mariano,   11/03/22 2107

## 2022-11-04 NOTE — PROGRESS NOTES
Care Management Discharge Note    Discharge Date: 11/04/2022       Discharge Disposition:      Discharge Services:      Discharge DME:      Discharge Transportation: family or friend will provide    Private pay costs discussed: Not applicable    Patient/Family in Agreement with the Plan:  yes    Handoff Referral Completed: Yes    Additional Information:  Patient is ordered Home PT. Per daughter, [facility] is in the building or they have been familiar with them. Writer sent referral to [facility]. [facility] accepted for Home PT to start next week. They told writer if a RN is needed, they would have a longer delay as they have no nurses available. They can see the orders in Epic and writer did not have to fax over orders.        Naty Small RN

## 2022-11-04 NOTE — DISCHARGE SUMMARY
[facility]  Hospitalist Discharge Summary      Date of Admission:  11/3/2022  Date of Discharge:  11/4/2022  Discharging Provider: Jv Baron MD  Discharge Service: Hospitalist Service    Discharge Diagnoses   Mechanical fall  Left hip/buttock pain  Left Gluteal hematoma    Follow-ups Needed After Discharge   Follow-up Appointments     Follow-up and recommended labs and tests       Follow up with primary care provider, Irvin Baptiste, within 14 days for   hospital follow- up and to discuss ongoing fall prevention. No follow up   labs or test are needed.             Unresulted Labs Ordered in the Past 30 Days of this Admission     No orders found for last 31 day(s).        Discharge Disposition   Discharged to home  Condition at discharge: Stable    Hospital Course   Suleiman Spain is a 89 year old male with a history of hypertension, hyperlipidemia, diabetes mellitus, GERD, peripheral neuropathy, and stroke who presented to the ER following a fall.  After evaluation in the ER the hospitalist service was contacted to bring him into the hospital for further evaluation and management.     Mechanical fall  Left hip/buttock pain  Left gluteal hematoma  Tripped and fell at his independent living facility.  Landed on his left hip and hit the left side of his head on a table.  He did not try to get up.  EMS was called and brought him into the ER for evaluation. CT head shows no acute intracranial abnormalities.  Low clinical suspicion that the nonspecific hypodense foci concerning for age-indeterminate infarcts are acute. CT pelvis negative for acute fracture but does show some hemorrhagic blood products within the left gluteal musculature without evidence for organized hematoma.    Seen by PT and social work with recommendation for home therapy    Daughter will work to get patient and wife into assisted living situation with more assistance    Tylenol 1000 mg q6h PRN pain (will avoid narcotics  and ibuprofen)    Rest, ice and elevation    Home PT ordered with minimal mobility and physical deconditioning with limited movement of <100 feet in current condition    Follow up with PCP within 14 days for post hospital follow up and to discuss continued fall prevention and transition to higher level of care in assisted living as well as oversee therapy    Hypertension    Continue PTA amlodipine.     Hyperlipidemia    Continue PTA rosuvastatin.     Diabetes mellitus  Peripheral neuropathy    Diet controlled.     GERD    Not currently on any treatment for this.     Presbycusis    He is very hard of hearing, even with his hearing aids in.     History of stroke    Left thalamus lacunar infarct in June 2012.    Consultations This Hospital Stay   PHYSICAL THERAPY ADULT IP CONSULT  CARE MANAGEMENT / SOCIAL WORK IP CONSULT    Code Status   Full Code    Time Spent on this Encounter   I, Jv Baron MD, personally saw the patient today and spent greater than 30 minutes discharging this patient.       Jv Baron MD  [facility] EXTENDED RECOVERY AND SHORT STAY  [address] [address]  Phone: [phone]  ______________________________________________________________________    Physical Exam   Vital Signs: Temp: 97.6  F (36.4  C) Temp src: Oral BP: (!) 149/71 Pulse: 52   Resp: 16 SpO2: 97 % O2 Device: None (Room air)    Weight: 163 lbs 4.8 oz  Constitutional: Awake, alert, cooperative, no apparent distress  Respiratory: Clear to auscultation bilaterally, no crackles or wheezing  Cardiovascular: Regular rate and rhythm, normal S1 and S2, and no murmur noted  GI: Normal bowel sounds, soft, non-distended, non-tender  Skin/Integumen: No rashes, no cyanosis, no edema  Musc: tenderness in left gluteal area       Primary Care Physician   Irvin Baptiste    Discharge Orders      Home Care Referral      Reason for your hospital stay    You were admitted for a fall and pain in your left  buttock.  Nothing was broken. The pain is getting better. With limited mobility PT/OT recommended home with home therapy. It was a pleasure for our Hospitalist group and me personally to care for you at [facility].  We wish you a speedy recovery and good health!     Follow-up and recommended labs and tests     Follow up with primary care provider, Irvin Baptiste, within 14 days for hospital follow- up and to discuss ongoing fall prevention. No follow up labs or test are needed.     Activity    Your activity upon discharge: activity as tolerated     Diet    Follow this diet upon discharge: Regular Diet     Significant Results and Procedures   Most Recent 3 CBC's:Recent Labs   Lab Test 11/03/22  1832   WBC 9.5   HGB 15.1   MCV 92        Most Recent 3 BMP's:Recent Labs   Lab Test 11/03/22  1832      POTASSIUM 3.8   CHLORIDE 110*   CO2 24   BUN 27   CR 0.92   ANIONGAP 6   SMILEY 8.9   *     Most Recent 2 LFT's:No lab results found.,   Results for orders placed or performed during the hospital encounter of 11/03/22   Head CT w/o contrast    Narrative    EXAM: CT HEAD W/O CONTRAST  LOCATION: [facility]  DATE/TIME: 11/3/2022 7:54 PM    INDICATION: Trauma. Check for traumatic bleed or skull fracture, pain after fall.  COMPARISON: MRI brain 6/7/2012.  TECHNIQUE: Routine CT Head without IV contrast. Multiplanar reformats. Dose reduction techniques were used.    FINDINGS:  INTRACRANIAL CONTENTS: The ventricles are normal in size and configuration. New small focus of hypodensity in the left corona radiata (series 3 image 19) concerning for age-indeterminate infarct. There also appears to be a couple of new small foci of   hypoattenuation in the superior right cerebellar hemisphere (series 3 image 11) concerning for possible age-indeterminate infarcts. Small focus of hypoattenuation in the inferior aspect of the right lentiform nucleus may represent a previously seen    mildly prominent dilated perivascular space versus a small age-indeterminate infarct. Elsewhere, mild to moderate scattered patchy nonspecific hypoattenuation in the cerebral white matter, probably due to chronic small vessel ischemic disease. Moderate   generalized brain parenchymal volume loss.    VISUALIZED ORBITS/SINUSES/MASTOIDS: Prior bilateral cataract surgery. Visualized portions of the orbits are otherwise unremarkable. No paranasal sinus mucosal disease. No middle ear or mastoid effusion.    BONES/SOFT TISSUES: No acute abnormality. No displaced calvarial fracture identified. Degenerative changes of the bilateral temporomandibular joints.      Impression    IMPRESSION:  1.  No acute intracranial hemorrhage, extra-axial fluid collection, or mass effect/herniation.  2.  A few small nonspecific hypodense foci, including in the left corona radiata and superior right cerebral hemisphere, concerning for age-indeterminate infarcts. If clinically warranted, MRI may be helpful for further characterization.  3.  Generalized brain atrophy and presumed chronic small vessel ischemic change, as described.   CT Pelvis Bone wo Contrast    Narrative    EXAM: CT PELVIS BONE WO CONTRAST  LOCATION: [facility]  DATE/TIME: 11/3/2022 7:54 PM    INDICATION: Check for left hip or pelvic fracture, pain left hip after fall.  COMPARISON: None.  TECHNIQUE: CT scan of the pelvis was performed without IV contrast. Multiplanar reformats were obtained. Dose reduction techniques were used.  CONTRAST: None.    FINDINGS:    Both hips are negative for fracture or CT evidence of avascular necrosis. Degenerative change of both hip joints. The pelvis is negative for fracture. Degenerative change at the symphysis pubis and SI joints. Degenerative change lower lumbar spine.   Partial sacralization of a transitional lumbosacral vertebral body.    There is asymmetric enlargement of the left gluteal musculature suggestive  of a component of hemorrhagic blood product without evidence for significant organized hematoma. The intrapelvic contents are negative for abnormal mass, free fluid, or   lymphadenopathy.      Impression    IMPRESSION:  1.  Both hips negative for fracture or CT evidence of avascular necrosis.  2.  Degenerative change of both hips.  3.  Pelvis negative for fracture.  4.  Degenerative change at the SI joints and symphysis pubis.  5.  Hemorrhagic blood product within the left gluteal musculature without evidence for organized hematoma.  6.  Intrapelvic contents negative.       Discharge Medications   Current Discharge Medication List      CONTINUE these medications which have CHANGED    Details   acetaminophen (TYLENOL) 500 MG tablet Take 2 tablets (1,000 mg) by mouth every 6 hours as needed for pain    Associated Diagnoses: Hip pain, left         CONTINUE these medications which have NOT CHANGED    Details   amLODIPine (NORVASC) 5 MG tablet Take 5 mg by mouth daily      bimatoprost (LUMIGAN) 0.01 % SOLN Place 1 drop Into the left eye At Bedtime      dorzolamide-timolol (COSOPT) 2-0.5 % ophthalmic solution Place 1 drop into both eyes 2 times daily      netarsudil (RHOPRESSA) 0.02 % ophthalmic solution Place 1 drop into the right eye At Bedtime      rosuvastatin (CRESTOR) 10 MG tablet Take 10 mg by mouth daily           Allergies   Allergies   Allergen Reactions     Aspirin Buffered Unknown and GI Disturbance     Brimonidine Other (See Comments)     stomach ache, head ache, sore muscles  Eye burning       Ibuprofen Unknown     Stomach ulcers and bleeding       Erythromycin Rash     Penicillins Rash

## 2022-11-04 NOTE — PHARMACY-ADMISSION MEDICATION HISTORY
Pharmacy Medication History  Admission medication history interview status for the 11/3/2022  admission is complete. See EPIC admission navigator for prior to admission medications     Location of Interview: Patient room  Medication history sources: Patient, Surescripts and Care Everywhere    Significant changes made to the medication list:      In the past week, patient estimated taking medication this percent of the time:n/a  Additional medication history information:   ---  Pt is hard of hearing.  It was difficult to interview patient.  He reported taking 3 pills in the morning but just took the last of 1 today (ran out).  Pt gets all his medications from [facility] and [facility].  I looked at both records and could only find amlodipine and rosuvastatin as po meds.  Pt also reported that he is not taking aspirin.    Medication reconciliation completed by provider prior to medication history? No    Time spent in this activity: 15 minutes    Prior to Admission medications    Medication Sig Last Dose Taking? Auth Provider Long Term End Date   amLODIPine (NORVASC) 5 MG tablet Take 5 mg by mouth daily 11/3/2022 at am Yes Reported, Patient Yes    bimatoprost (LUMIGAN) 0.01 % SOLN Place 1 drop Into the left eye At Bedtime 11/2/2022 at hs Yes Reported, Patient     dorzolamide-timolol (COSOPT) 2-0.5 % ophthalmic solution Place 1 drop into both eyes 2 times daily 11/3/2022 at am Yes Reported, Patient     netarsudil (RHOPRESSA) 0.02 % ophthalmic solution Place 1 drop into the right eye At Bedtime 11/2/2022 at hs Yes Unknown, Entered By History     rosuvastatin (CRESTOR) 10 MG tablet Take 10 mg by mouth daily 11/3/2022 at am Yes Unknown, Entered By History No        The information provided in this note is only as accurate as the sources available at the time of update(s)

## 2022-11-04 NOTE — CONSULTS
Care Management Initial Consult    General Information  Assessment completed with: Patient, Spring  Type of CM/SW Visit: Initial Assessment    Primary Care Provider verified and updated as needed: Yes   Readmission within the last 30 days: no previous admission in last 30 days      Reason for Consult: discharge planning  Advance Care Planning:            Communication Assessment  Patient's communication style: spoken language (English or Bilingual)             Cognitive  Cognitive/Neuro/Behavioral: WDL                      Living Environment:   People in home: spouse  Arliss  Current living Arrangements: independent living facility      Able to return to prior arrangements: yes       Family/Social Support:  Care provided by: self  Provides care for: spouse  Marital Status:   Children, Wife  Arliss       Description of Support System: Supportive         Current Resources:   Patient receiving home care services: No     Community Resources: None  Equipment currently used at home: walker, rolling  Supplies currently used at home:      Employment/Financial:  Employment Status: retired        Financial Concerns: No concerns identified           Lifestyle & Psychosocial Needs:  Social Determinants of Health     Tobacco Use: Medium Risk     Smoking Tobacco Use: Former     Smokeless Tobacco Use: Never     Passive Exposure: Not on file   Alcohol Use: Not on file   Financial Resource Strain: Not on file   Food Insecurity: Not on file   Transportation Needs: Not on file   Physical Activity: Not on file   Stress: Not on file   Social Connections: Not on file   Intimate Partner Violence: Not on file   Depression: Not on file   Housing Stability: Not on file       Functional Status:  Prior to admission patient needed assistance:   Dependent ADLs:: Independent, Ambulation-cane  Dependent IADLs:: Transportation, Money Management       Mental Health Status:  Mental Health Status: No Current Concerns       Chemical Dependency  Status:      None noted          Values/Beliefs:  Spiritual, Cultural Beliefs, Congregational Practices, Values that affect care: no               Additional Information:  Met with patient, daughter and son in law. Daughter would like her parents to move into the [place] part of [place]. The patient is helping his spouse with medication reminders and set up. Daughter wanted advise on how to tell her mother she needs to move. Writer  Told Spring valencia, patient has STM issues as well. Patient and spouse sound like they need more support. Spring Valencia has them on the waiting list of the [facility] . Writer can set up home care for patient if needed.     Naty Small RN

## 2022-11-04 NOTE — ED PROVIDER NOTES
"  History     Chief Complaint:  Fall, Head Injury, and Hip Pain       HPI   Suleiman Spain is a 89 year old male presents ER for evaluation of left hip pain and after tripping and falling at his assisted living facility.  He has a mild headache and hip pain but denies any chest pain, abdominal pain, back pain or other.  He is hard of hearing.  No loss of consciousness.    ROS:  Review of Systems   Musculoskeletal: Positive for joint swelling.   Neurological: Positive for headaches.      All other systems reviewed and are negative.    Allergies:  Aspirin Buffered  Brimonidine  Ibuprofen  Erythromycin  Penicillins     Medications:    amLODIPine (NORVASC) 5 MG tablet  Aspirin Buf,CaCarb-MgCarb-MgO, 81 MG TABS  bimatoprost (LUMIGAN) 0.01 % SOLN  bimatoprost (LUMIGAN) 0.03 % ophthalmic solution  diclofenac (VOLTAREN) 1 % topical gel  dorzolamide-timolol (COSOPT) 2-0.5 % ophthalmic solution  fish oil-omega-3 fatty acids 1000 MG capsule  ketorolac (ACULAR) 0.5 % ophthalmic solution  rosuvastatin (CRESTOR) 10 MG tablet        Past Medical History:    Past Medical History:   Diagnosis Date     Cervicalgia 9/24/2009     Patient Active Problem List   Diagnosis   (none) - all problems resolved or deleted        Past Surgical History:    No past surgical history on file.     Family History:    family history is not on file.    Social History:   reports that he has never smoked. He has never used smokeless tobacco.  PCP: Irvin Baptiste     Physical Exam     Patient Vitals for the past 24 hrs:   BP Temp Pulse Resp SpO2 Height Weight   11/03/22 1820 139/60 97.9  F (36.6  C) 69 20 97 % 1.702 m (5' 7\") 72.6 kg (160 lb)        Physical Exam  General: Well appearing, pleasant male, resting on exam bed  HEENT: No evidence of trauma.  Conjunctive are clear.  Extraocular eye movements intact.  Neck range of motion intact.  Nose and throat clear.  Respiratory: Good breath sounds bilaterally  Cardiovascular: Normal rate and rhythm "   Gastrointestinal: Soft, nontender.   Musculoskeletal: Atraumatic.  No spinal tenderness.  Skin: Exposed skin clear.  Neurologic: Alert.  Psych:  Patient is cooperative, with normal affect.  Left hip: Significant tenderness to his left lateral hip.  No tenderness to the rest of his leg.  Range of motion is limited secondary to pain.  Distally sensation is intact.  Good PT pulse.    Emergency Department Course   ECG:  ECG results from 11/03/22   EKG 12 lead     Value    Systolic Blood Pressure     Diastolic Blood Pressure     Ventricular Rate 52    Atrial Rate 52    MI Interval 174    QRS Duration 94        QTc 407    P Axis 43    R AXIS -5    T Axis 17    Interpretation ECG      Sinus bradycardia  Otherwise normal ECG  When compared with ECG of 19-JUL-1996 22:59,  Nonspecific T wave abnormality has replaced inverted T waves in Inferior leads         Imaging:  CT Pelvis Bone wo Contrast   Final Result   IMPRESSION:   1.  Both hips negative for fracture or CT evidence of avascular necrosis.   2.  Degenerative change of both hips.   3.  Pelvis negative for fracture.   4.  Degenerative change at the SI joints and symphysis pubis.   5.  Hemorrhagic blood product within the left gluteal musculature without evidence for organized hematoma.   6.  Intrapelvic contents negative.      Head CT w/o contrast   Preliminary Result   IMPRESSION:   1.  No acute intracranial hemorrhage, extra-axial fluid collection, or mass effect/herniation.   2.  A few small nonspecific hypodense foci, including in the left corona radiata and superior right cerebral hemisphere, concerning for age-indeterminate infarcts. If clinically warranted, MRI may be helpful for further characterization.   3.  Generalized brain atrophy and presumed chronic small vessel ischemic change, as described.           Laboratory:  Labs Ordered and Resulted from Time of ED Arrival to Time of ED Departure   BASIC METABOLIC PANEL - Abnormal       Result Value     Sodium 140      Potassium 3.8      Chloride 110 (*)     Carbon Dioxide (CO2) 24      Anion Gap 6      Urea Nitrogen 27      Creatinine 0.92      Calcium 8.9      Glucose 106 (*)     GFR Estimate 79     INR - Normal    INR 1.05     CBC WITH PLATELETS AND DIFFERENTIAL    WBC Count 9.5      RBC Count 5.04      Hemoglobin 15.1      Hematocrit 46.5      MCV 92      MCH 30.0      MCHC 32.5      RDW 13.1      Platelet Count 222      % Neutrophils 68      % Lymphocytes 19      % Monocytes 10      % Eosinophils 2      % Basophils 1      % Immature Granulocytes 0      NRBCs per 100 WBC 0      Absolute Neutrophils 6.4      Absolute Lymphocytes 1.8      Absolute Monocytes 1.0      Absolute Eosinophils 0.2      Absolute Basophils 0.1      Absolute Immature Granulocytes 0.0      Absolute NRBCs 0.0     COVID-19 VIRUS (CORONAVIRUS) BY PCR        Interventions:  Medications   HYDROmorphone (PF) (DILAUDID) injection 0.2 mg (has no administration in time range)   HYDROmorphone (PF) (DILAUDID) injection 0.3 mg (0.3 mg Intravenous Given 11/3/22 1927)   acetaminophen (TYLENOL) tablet 1,000 mg (1,000 mg Oral Given 11/3/22 2023)        Impression & Plan      Medical Decision Making:  Suleiman Spain is a 89 year old male presents the ER for evaluation after a nonsyncopal fall where he tripped.  See HPI.  His vitals are unremarkable.  He has significant tenderness to his left lateral hip.  Range of motion is limited secondary to pain.  Distally CMS is intact.  He also hit his head and was complaining of a little bit of a headache.  No other injuries are noted.  CT of his head and pelvis without any acute abnormalities.  Discussed with him and his daughter that there were some age-indeterminate strokes.  He is moving all extremities currently and is not complaining of any neurologic symptoms.  He was given the interventions above.  I talked with his daughter over the phone about his visit today.  He is unable to walk.  He comes from  independent living.  He will need more assistance for activities of daily living.  We will admitted to the hospital.  I staffed the case with Dr. Mariano.  Dr. Mckeon is excepting from the hospital service.  Labs were obtained and reassuring.  EKG unremarkable.    Diagnosis:    ICD-10-CM    1. Hip pain, left  M25.552       2. Closed head injury, initial encounter  S09.90XA       3. Abnormal head CT  R93.0            Discharge Medications:  New Prescriptions    No medications on file        11/3/2022   Matt Man PA-C Cyr, Matthew R, PA-C  11/03/22 2037

## 2023-07-22 ENCOUNTER — HOSPITAL ENCOUNTER (EMERGENCY)
Facility: CLINIC | Age: 88
Discharge: HOME | End: 2023-07-22
Attending: EMERGENCY MEDICINE | Admitting: EMERGENCY MEDICINE
Payer: MEDICARE

## 2023-07-22 ENCOUNTER — APPOINTMENT (OUTPATIENT)
Dept: GENERAL RADIOLOGY | Facility: CLINIC | Age: 88
End: 2023-07-22
Attending: EMERGENCY MEDICINE
Payer: MEDICARE

## 2023-07-22 ENCOUNTER — APPOINTMENT (OUTPATIENT)
Dept: CT IMAGING | Facility: CLINIC | Age: 88
End: 2023-07-22
Attending: EMERGENCY MEDICINE
Payer: MEDICARE

## 2023-07-22 VITALS
HEART RATE: 50 BPM | DIASTOLIC BLOOD PRESSURE: 68 MMHG | TEMPERATURE: 98.3 F | RESPIRATION RATE: 16 BRPM | SYSTOLIC BLOOD PRESSURE: 155 MMHG | OXYGEN SATURATION: 97 %

## 2023-07-22 DIAGNOSIS — M25.552 HIP PAIN, LEFT: ICD-10-CM

## 2023-07-22 PROCEDURE — 72192 CT PELVIS W/O DYE: CPT | Mod: MA

## 2023-07-22 PROCEDURE — 250N000013 HC RX MED GY IP 250 OP 250 PS 637: Performed by: EMERGENCY MEDICINE

## 2023-07-22 PROCEDURE — 73502 X-RAY EXAM HIP UNI 2-3 VIEWS: CPT

## 2023-07-22 PROCEDURE — 99284 EMERGENCY DEPT VISIT MOD MDM: CPT | Mod: 25

## 2023-07-22 RX ORDER — TRAMADOL HYDROCHLORIDE 50 MG/1
50 TABLET ORAL EVERY 6 HOURS PRN
Qty: 10 TABLET | Refills: 0 | Status: ON HOLD | OUTPATIENT
Start: 2023-07-22 | End: 2024-01-01

## 2023-07-22 RX ORDER — TRAMADOL HYDROCHLORIDE 50 MG/1
50 TABLET ORAL ONCE
Status: COMPLETED | OUTPATIENT
Start: 2023-07-22 | End: 2023-07-22

## 2023-07-22 RX ADMIN — TRAMADOL HYDROCHLORIDE 50 MG: 50 TABLET, COATED ORAL at 21:37

## 2023-07-22 ASSESSMENT — ACTIVITIES OF DAILY LIVING (ADL)
ADLS_ACUITY_SCORE: 36
ADLS_ACUITY_SCORE: 37

## 2023-07-22 NOTE — ED NOTES
Bed: ED13  Expected date:   Expected time:   Means of arrival:   Comments:  [address] [address] 89 M spontaneous low back pain hip pain eta 1700

## 2023-07-22 NOTE — ED TRIAGE NOTES
Pt arrives via EMS after 2-3 days of increasing L hip pain that radiates to his groin, pt came in today because of a slight decrease in his mobility, pt has hx of fall 1 year ago w/ L hip injury, pt [place],  ABCD intact

## 2023-07-23 NOTE — ED NOTES
Pt ambulated in hallway with walker, pt states he uses walker at home. Pt tolerated ambulation well, still endorses pain in the L hip. MD notified.

## 2023-07-23 NOTE — ED PROVIDER NOTES
History     Chief Complaint:  Hip Pain       The history is provided by the patient.      Suleiman Spain is a 89 year old male who presents with left hip pain. The patient reports experiencing left hip pain for months prior but notes that the past 2 days has increased in severity and he has not been able to sleep due to pain. He has had a decrease in his mobility causing him to present to the ED.     Independent Historian:   No independent historian     Review of External Notes:  Yes-I reviewed his admission at this hospital in November 2022 when he suffered a fall and had hip pain.    Medications:    Amlodipine   Crestor     Past Medical History:    Hypertension   Diabetes Mellitus   GERD   Hyperlipidemia   Peripheral neuropathy   Presbycusis   Stroke     Past Surgical History:    Cataract extraction   Laser surgery   Scleral fistulization   Tonsillectomy      Physical Exam     Patient Vitals for the past 24 hrs:   BP Temp Temp src Pulse Resp SpO2   07/22/23 1926 -- -- -- -- -- 97 %   07/22/23 1800 (!) 155/68 -- -- 50 -- 98 %   07/22/23 1726 (!) 144/67 -- -- -- -- 98 %   07/22/23 1720 136/69 98.3  F (36.8  C) Oral 50 16 97 %        Physical Exam  Eye:  Pupils are equal, round, and reactive.  Extraocular movements intact.    ENT:  No rhinorrhea.  Moist mucus membranes.  Normal tongue and tonsil.    Cardiac:  Regular rate and rhythm.  No murmurs, gallops, or rubs.    Pulmonary:  Clear to auscultation bilaterally.  No wheezes, rales, or rhonchi.    Abdomen:  Positive bowel sounds.  Abdomen is soft and non-distended, without focal tenderness.    Musculoskeletal:  Normal movement of all extremities without evidence for deficit. Severe pain over the greater trochanter and left lateral hip with pain with range of motion     Skin:  Warm and dry without rashes.    Neurologic:  Non-focal exam without asymmetric weakness or numbness.     Psychiatric:  Normal affect with appropriate interaction with examiner.    Emergency  Department Course     Imaging:  CT Pelvis Bone wo Contrast   Final Result   IMPRESSION:   1.  Pelvis CT negative for acute fracture or joint malalignment.      2.  No acute soft tissue injury identified.      3.  Advanced degenerative disc and facet arthropathy in the lower lumbar spine.      4.  Mild degenerative arthritic changes in both hips.      5.  Moderate chronic atrophy of the pelvic and hip musculature.      XR Pelvis w Hip Left 1 View   Final Result   IMPRESSION: Left hip and pelvis negative for acute fracture. Normal joint alignment. Mild degenerative arthritic joint space narrowing and spurring in both hips, which is symmetric. Moderate advanced degenerative changes in the lower lumbar spine. Mild    degenerative changes in the bilateral SI joints. Transitional lumbosacral anatomy noted.        Report per radiology    Emergency Department Course & Assessments:    Interventions:  Medications   traMADol (ULTRAM) tablet 50 mg (50 mg Oral $Given 7/22/23 2137)        Assessments:  1949 I obtained history and examined the patient as noted above.   2058 I rechecked the patient and explained findings after being informed that the patient passed their road test. We discussed plan for discharge.   2101 I spoke with the patient's daughter regarding patient's presentation and plan for discharge.     Independent Interpretation (X-rays, CTs, rhythm strip):  I reviewed the patient's x-ray with no evidence of acute fracture.     Consultations/Discussion of Management or Tests:  None       Social Determinants of Health affecting care:  None     Disposition:  The patient was discharged to home.     Impression & Plan    CMS Diagnoses: None    Medical Decision Making:  This unfortunate 89-year-old man with a history of hip pain presents to us with increasing hip discomfort.  The patient had a fall in November 2022 for which she was evaluated here.  He notes that he has had hip pain ever since this fall.  It has been worse  over the past 3 to 4 days.  He is able to ambulate, but he notes that the pain is kept him awake the last few nights.  He denies any new trauma or weakness or numbness to the leg.    On my exam, he is resting comfortably on the bed.  He does have generalized discomfort throughout the hip joint and allow some range of motion.  Interestingly, he feels better when he is ambulating with it, able to walk up and down the [place] with a walker without any assistance.  He underwent an x-ray, ordered by triage on his initial assessment.  This study was negative.  With his age and nontraumatic pain, I elected to obtain CT imaging of this area to verify that there was not a pathologic fracture or other lesion.  Fortunately, this study is also negative other than showing advanced arthritis.    At this point, I spoke with his daughter.  She feels as though her father has been doing fairly well despite his independent living and having to take care of of his ailing wife who has Alzheimer's disease.  She feels comfortable picking up and taking him home.  She has no other concerns for his appetite or other medical issues and we will hold off on other imaging or blood work.  I we will prescribe him a limited course of tramadol which he can use at bedtime.  Otherwise, I recommended close orthopedic follow-up to consider possible cortisone injection into the hip.  The patient's questions were answered he is comfortable with plan for discharge.    Diagnosis:    ICD-10-CM    1. Hip pain, left  M25.552            Discharge Medications:  Discharge Medication List as of 7/22/2023  9:43 PM      START taking these medications    Details   traMADol (ULTRAM) 50 MG tablet Take 1 tablet (50 mg) by mouth every 6 hours as needed for severe pain, Disp-10 tablet, R-0, Local Print            Scribe Disclosure:  I, Shana Garcia, am serving as a scribe at 8:10 PM on 7/22/2023 to document services personally performed by Trierweiler, Chad A, MD based on  my observations and the provider's statements to me.    7/22/2023   Trierweiler, Chad A, MD Trierweiler, Chad A, MD  07/23/23 0783

## 2023-07-23 NOTE — ED NOTES
Pt discharged home with family, used walker, tolerated ambulation well. Discharge paperwork reviewed with family and pt, verbalized understanding. Pt left with all belongings.

## 2023-07-23 NOTE — DISCHARGE INSTRUCTIONS
As discussed, there is no evidence for acute fracture or other issue with the hip.  There is significant arthritis.  Suleiman may benefit from seeing an orthopedist and getting a cortisone shot.  Call [facility] tomorrow for an appointment.  Use tylenol for pain and the provided Tramadol at bedtime for severe pain.  Return to the ER for any other emergent concerns.

## 2024-01-01 ENCOUNTER — HOSPITAL ENCOUNTER (OUTPATIENT)
Facility: CLINIC | Age: 89
Discharge: HOME HEALTH | End: 2024-10-16
Attending: INTERNAL MEDICINE | Admitting: INTERNAL MEDICINE
Payer: MEDICARE

## 2024-01-01 ENCOUNTER — MEDICAL CORRESPONDENCE (OUTPATIENT)
Dept: HEALTH INFORMATION MANAGEMENT | Facility: CLINIC | Age: 89
End: 2024-01-01

## 2024-01-01 ENCOUNTER — APPOINTMENT (OUTPATIENT)
Dept: GENERAL RADIOLOGY | Facility: CLINIC | Age: 89
End: 2024-01-01
Attending: INTERNAL MEDICINE

## 2024-01-01 ENCOUNTER — HOSPITAL ENCOUNTER (EMERGENCY)
Facility: CLINIC | Age: 89
Discharge: NURSING, REHAB OR LONG-TERM CARE | End: 2024-10-05
Attending: EMERGENCY MEDICINE | Admitting: EMERGENCY MEDICINE
Payer: MEDICARE

## 2024-01-01 ENCOUNTER — HOSPITAL ENCOUNTER (OUTPATIENT)
Facility: CLINIC | Age: 89
Discharge: NURSING, REHAB OR LONG-TERM CARE | End: 2024-10-11
Attending: HOSPITALIST | Admitting: HOSPITALIST
Payer: MEDICARE

## 2024-01-01 ENCOUNTER — HOSPITAL ENCOUNTER (EMERGENCY)
Facility: CLINIC | Age: 89
Discharge: HOME | End: 2024-10-04
Attending: EMERGENCY MEDICINE | Admitting: EMERGENCY MEDICINE
Payer: MEDICARE

## 2024-01-01 ENCOUNTER — APPOINTMENT (OUTPATIENT)
Dept: SPEECH THERAPY | Facility: CLINIC | Age: 89
End: 2024-01-01
Attending: HOSPITALIST

## 2024-01-01 ENCOUNTER — APPOINTMENT (OUTPATIENT)
Dept: MRI IMAGING | Facility: CLINIC | Age: 89
End: 2024-01-01
Attending: STUDENT IN AN ORGANIZED HEALTH CARE EDUCATION/TRAINING PROGRAM

## 2024-01-01 ENCOUNTER — APPOINTMENT (OUTPATIENT)
Dept: CT IMAGING | Facility: CLINIC | Age: 89
End: 2024-01-01
Attending: EMERGENCY MEDICINE
Payer: MEDICARE

## 2024-01-01 ENCOUNTER — APPOINTMENT (OUTPATIENT)
Dept: SPEECH THERAPY | Facility: CLINIC | Age: 89
End: 2024-01-01

## 2024-01-01 ENCOUNTER — APPOINTMENT (OUTPATIENT)
Dept: PHYSICAL THERAPY | Facility: CLINIC | Age: 89
End: 2024-01-01
Attending: INTERNAL MEDICINE

## 2024-01-01 ENCOUNTER — PATIENT OUTREACH (OUTPATIENT)
Dept: CARE COORDINATION | Facility: CLINIC | Age: 89
End: 2024-01-01
Payer: MEDICARE

## 2024-01-01 VITALS
HEART RATE: 64 BPM | RESPIRATION RATE: 18 BRPM | SYSTOLIC BLOOD PRESSURE: 189 MMHG | DIASTOLIC BLOOD PRESSURE: 84 MMHG | OXYGEN SATURATION: 97 % | TEMPERATURE: 98.5 F

## 2024-01-01 VITALS
SYSTOLIC BLOOD PRESSURE: 132 MMHG | RESPIRATION RATE: 19 BRPM | DIASTOLIC BLOOD PRESSURE: 107 MMHG | TEMPERATURE: 97.6 F | OXYGEN SATURATION: 93 % | HEART RATE: 68 BPM

## 2024-01-01 VITALS
DIASTOLIC BLOOD PRESSURE: 65 MMHG | HEART RATE: 73 BPM | RESPIRATION RATE: 16 BRPM | OXYGEN SATURATION: 94 % | TEMPERATURE: 98 F | SYSTOLIC BLOOD PRESSURE: 134 MMHG

## 2024-01-01 VITALS
DIASTOLIC BLOOD PRESSURE: 75 MMHG | SYSTOLIC BLOOD PRESSURE: 127 MMHG | OXYGEN SATURATION: 92 % | TEMPERATURE: 98 F | RESPIRATION RATE: 18 BRPM | HEART RATE: 80 BPM

## 2024-01-01 DIAGNOSIS — R53.1 GENERALIZED WEAKNESS: ICD-10-CM

## 2024-01-01 DIAGNOSIS — Z51.5 END OF LIFE CARE: Primary | ICD-10-CM

## 2024-01-01 DIAGNOSIS — Z86.59 HISTORY OF DEMENTIA: ICD-10-CM

## 2024-01-01 DIAGNOSIS — R33.9 URINARY RETENTION: ICD-10-CM

## 2024-01-01 DIAGNOSIS — R53.1 WEAKNESS GENERALIZED: ICD-10-CM

## 2024-01-01 LAB
ALBUMIN SERPL BCG-MCNC: 3.3 G/DL (ref 3.5–5.2)
ALBUMIN UR-MCNC: NEGATIVE MG/DL
ALP SERPL-CCNC: 88 U/L (ref 40–150)
ALT SERPL W P-5'-P-CCNC: 11 U/L (ref 0–70)
ANION GAP SERPL CALCULATED.3IONS-SCNC: 9 MMOL/L (ref 7–15)
APPEARANCE UR: CLEAR
AST SERPL W P-5'-P-CCNC: 18 U/L (ref 0–45)
ATRIAL RATE - MUSE: 53 BPM
ATRIAL RATE - MUSE: 78 BPM
BASOPHILS # BLD AUTO: 0.1 10E3/UL (ref 0–0.2)
BASOPHILS NFR BLD AUTO: 1 %
BILIRUB SERPL-MCNC: 0.4 MG/DL
BILIRUB UR QL STRIP: NEGATIVE
BUN SERPL-MCNC: 15.4 MG/DL (ref 8–23)
CALCIUM SERPL-MCNC: 8.1 MG/DL (ref 8.8–10.4)
CHLORIDE SERPL-SCNC: 108 MMOL/L (ref 98–107)
COLOR UR AUTO: NORMAL
CREAT SERPL-MCNC: 0.79 MG/DL (ref 0.67–1.17)
DIASTOLIC BLOOD PRESSURE - MUSE: NORMAL MMHG
DIASTOLIC BLOOD PRESSURE - MUSE: NORMAL MMHG
EGFRCR SERPLBLD CKD-EPI 2021: 83 ML/MIN/1.73M2
EOSINOPHIL # BLD AUTO: 0.5 10E3/UL (ref 0–0.7)
EOSINOPHIL NFR BLD AUTO: 6 %
ERYTHROCYTE [DISTWIDTH] IN BLOOD BY AUTOMATED COUNT: 12.7 % (ref 10–15)
GLUCOSE SERPL-MCNC: 93 MG/DL (ref 70–99)
GLUCOSE UR STRIP-MCNC: NEGATIVE MG/DL
HCO3 SERPL-SCNC: 24 MMOL/L (ref 22–29)
HCT VFR BLD AUTO: 45.8 % (ref 40–53)
HGB BLD-MCNC: 14.8 G/DL (ref 13.3–17.7)
HGB UR QL STRIP: NEGATIVE
IMM GRANULOCYTES # BLD: 0 10E3/UL
IMM GRANULOCYTES NFR BLD: 0 %
INTERPRETATION ECG - MUSE: NORMAL
INTERPRETATION ECG - MUSE: NORMAL
KETONES UR STRIP-MCNC: NEGATIVE MG/DL
LEUKOCYTE ESTERASE UR QL STRIP: NEGATIVE
LYMPHOCYTES # BLD AUTO: 1.9 10E3/UL (ref 0.8–5.3)
LYMPHOCYTES NFR BLD AUTO: 24 %
MCH RBC QN AUTO: 30 PG (ref 26.5–33)
MCHC RBC AUTO-ENTMCNC: 32.3 G/DL (ref 31.5–36.5)
MCV RBC AUTO: 93 FL (ref 78–100)
MONOCYTES # BLD AUTO: 0.9 10E3/UL (ref 0–1.3)
MONOCYTES NFR BLD AUTO: 12 %
NEUTROPHILS # BLD AUTO: 4.6 10E3/UL (ref 1.6–8.3)
NEUTROPHILS NFR BLD AUTO: 57 %
NITRATE UR QL: NEGATIVE
NRBC # BLD AUTO: 0 10E3/UL
NRBC BLD AUTO-RTO: 0 /100
P AXIS - MUSE: 74 DEGREES
P AXIS - MUSE: 80 DEGREES
PH UR STRIP: 7 [PH] (ref 5–7)
PLATELET # BLD AUTO: 200 10E3/UL (ref 150–450)
POTASSIUM SERPL-SCNC: 4 MMOL/L (ref 3.4–5.3)
PR INTERVAL - MUSE: 172 MS
PR INTERVAL - MUSE: 192 MS
PROCALCITONIN SERPL IA-MCNC: 0.1 NG/ML
PROT SERPL-MCNC: 5.6 G/DL (ref 6.4–8.3)
QRS DURATION - MUSE: 90 MS
QRS DURATION - MUSE: 92 MS
QT - MUSE: 392 MS
QT - MUSE: 446 MS
QTC - MUSE: 418 MS
QTC - MUSE: 446 MS
R AXIS - MUSE: -14 DEGREES
R AXIS - MUSE: -26 DEGREES
RBC # BLD AUTO: 4.93 10E6/UL (ref 4.4–5.9)
RBC URINE: <1 /HPF
SODIUM SERPL-SCNC: 141 MMOL/L (ref 135–145)
SP GR UR STRIP: 1.01 (ref 1–1.03)
SYSTOLIC BLOOD PRESSURE - MUSE: NORMAL MMHG
SYSTOLIC BLOOD PRESSURE - MUSE: NORMAL MMHG
T AXIS - MUSE: 20 DEGREES
T AXIS - MUSE: 7 DEGREES
TROPONIN T SERPL HS-MCNC: 14 NG/L
TROPONIN T SERPL HS-MCNC: 15 NG/L
UROBILINOGEN UR STRIP-MCNC: NORMAL MG/DL
VENTRICULAR RATE- MUSE: 53 BPM
VENTRICULAR RATE- MUSE: 78 BPM
WBC # BLD AUTO: 8 10E3/UL (ref 4–11)
WBC URINE: <1 /HPF

## 2024-01-01 PROCEDURE — 96361 HYDRATE IV INFUSION ADD-ON: CPT

## 2024-01-01 PROCEDURE — G0378 HOSPITAL OBSERVATION PER HR: HCPCS

## 2024-01-01 PROCEDURE — 96376 TX/PRO/DX INJ SAME DRUG ADON: CPT

## 2024-01-01 PROCEDURE — 99285 EMERGENCY DEPT VISIT HI MDM: CPT | Mod: 25

## 2024-01-01 PROCEDURE — 84145 PROCALCITONIN (PCT): CPT | Performed by: INTERNAL MEDICINE

## 2024-01-01 PROCEDURE — 51702 INSERT TEMP BLADDER CATH: CPT

## 2024-01-01 PROCEDURE — 101N000002 HC CUSTODIAL CARE DAILY

## 2024-01-01 PROCEDURE — 250N000011 HC RX IP 250 OP 636: Performed by: EMERGENCY MEDICINE

## 2024-01-01 PROCEDURE — 250N000013 HC RX MED GY IP 250 OP 250 PS 637: Performed by: HOSPITALIST

## 2024-01-01 PROCEDURE — 99207 PR NO BILLABLE SERVICE THIS VISIT: CPT | Performed by: INTERNAL MEDICINE

## 2024-01-01 PROCEDURE — 80053 COMPREHEN METABOLIC PANEL: CPT | Performed by: EMERGENCY MEDICINE

## 2024-01-01 PROCEDURE — 96374 THER/PROPH/DIAG INJ IV PUSH: CPT | Mod: 59

## 2024-01-01 PROCEDURE — 97161 PT EVAL LOW COMPLEX 20 MIN: CPT | Mod: GP | Performed by: PHYSICAL THERAPIST

## 2024-01-01 PROCEDURE — 84484 ASSAY OF TROPONIN QUANT: CPT | Mod: 91 | Performed by: EMERGENCY MEDICINE

## 2024-01-01 PROCEDURE — 92526 ORAL FUNCTION THERAPY: CPT | Mod: GN | Performed by: SPEECH-LANGUAGE PATHOLOGIST

## 2024-01-01 PROCEDURE — 97530 THERAPEUTIC ACTIVITIES: CPT | Mod: GP | Performed by: PHYSICAL THERAPIST

## 2024-01-01 PROCEDURE — 99222 1ST HOSP IP/OBS MODERATE 55: CPT | Performed by: HOSPITALIST

## 2024-01-01 PROCEDURE — 99232 SBSQ HOSP IP/OBS MODERATE 35: CPT | Performed by: INTERNAL MEDICINE

## 2024-01-01 PROCEDURE — 81001 URINALYSIS AUTO W/SCOPE: CPT | Performed by: EMERGENCY MEDICINE

## 2024-01-01 PROCEDURE — 85025 COMPLETE CBC W/AUTO DIFF WBC: CPT | Performed by: EMERGENCY MEDICINE

## 2024-01-01 PROCEDURE — 70450 CT HEAD/BRAIN W/O DYE: CPT | Mod: MG

## 2024-01-01 PROCEDURE — 250N000013 HC RX MED GY IP 250 OP 250 PS 637: Performed by: EMERGENCY MEDICINE

## 2024-01-01 PROCEDURE — 250N000011 HC RX IP 250 OP 636: Performed by: HOSPITALIST

## 2024-01-01 PROCEDURE — 93005 ELECTROCARDIOGRAM TRACING: CPT | Mod: 59

## 2024-01-01 PROCEDURE — 250N000013 HC RX MED GY IP 250 OP 250 PS 637: Performed by: INTERNAL MEDICINE

## 2024-01-01 PROCEDURE — 96374 THER/PROPH/DIAG INJ IV PUSH: CPT

## 2024-01-01 PROCEDURE — 258N000003 HC RX IP 258 OP 636: Performed by: STUDENT IN AN ORGANIZED HEALTH CARE EDUCATION/TRAINING PROGRAM

## 2024-01-01 PROCEDURE — 36415 COLL VENOUS BLD VENIPUNCTURE: CPT | Performed by: EMERGENCY MEDICINE

## 2024-01-01 PROCEDURE — 96360 HYDRATION IV INFUSION INIT: CPT

## 2024-01-01 PROCEDURE — 99239 HOSP IP/OBS DSCHRG MGMT >30: CPT | Performed by: INTERNAL MEDICINE

## 2024-01-01 PROCEDURE — 92526 ORAL FUNCTION THERAPY: CPT | Mod: GN

## 2024-01-01 PROCEDURE — 71045 X-RAY EXAM CHEST 1 VIEW: CPT

## 2024-01-01 PROCEDURE — G1010 CDSM STANSON: HCPCS

## 2024-01-01 PROCEDURE — 93005 ELECTROCARDIOGRAM TRACING: CPT

## 2024-01-01 PROCEDURE — 70551 MRI BRAIN STEM W/O DYE: CPT | Mod: MG

## 2024-01-01 PROCEDURE — 99232 SBSQ HOSP IP/OBS MODERATE 35: CPT | Performed by: STUDENT IN AN ORGANIZED HEALTH CARE EDUCATION/TRAINING PROGRAM

## 2024-01-01 PROCEDURE — 92610 EVALUATE SWALLOWING FUNCTION: CPT | Mod: GN | Performed by: REHABILITATION PRACTITIONER

## 2024-01-01 PROCEDURE — 36415 COLL VENOUS BLD VENIPUNCTURE: CPT | Performed by: INTERNAL MEDICINE

## 2024-01-01 PROCEDURE — 258N000003 HC RX IP 258 OP 636: Performed by: HOSPITALIST

## 2024-01-01 PROCEDURE — 99207 PR NO BILLABLE SERVICE THIS VISIT: CPT | Performed by: HOSPITALIST

## 2024-01-01 PROCEDURE — 250N000013 HC RX MED GY IP 250 OP 250 PS 637: Performed by: STUDENT IN AN ORGANIZED HEALTH CARE EDUCATION/TRAINING PROGRAM

## 2024-01-01 RX ORDER — VITAMIN B COMPLEX
25 TABLET ORAL DAILY
Status: DISCONTINUED | OUTPATIENT
Start: 2024-01-01 | End: 2024-01-01 | Stop reason: HOSPADM

## 2024-01-01 RX ORDER — QUETIAPINE FUMARATE 25 MG/1
25 TABLET, FILM COATED ORAL 4 TIMES DAILY PRN
Status: DISCONTINUED | OUTPATIENT
Start: 2024-01-01 | End: 2024-01-01 | Stop reason: HOSPADM

## 2024-01-01 RX ORDER — QUETIAPINE FUMARATE 25 MG/1
25 TABLET, FILM COATED ORAL EVERY 6 HOURS PRN
Status: DISCONTINUED | OUTPATIENT
Start: 2024-01-01 | End: 2024-01-01

## 2024-01-01 RX ORDER — DORZOLAMIDE HYDROCHLORIDE AND TIMOLOL MALEATE 20; 5 MG/ML; MG/ML
1 SOLUTION/ DROPS OPHTHALMIC 2 TIMES DAILY
Status: DISCONTINUED | OUTPATIENT
Start: 2024-01-01 | End: 2024-01-01 | Stop reason: HOSPADM

## 2024-01-01 RX ORDER — RIVASTIGMINE TARTRATE 1.5 MG/1
1.5 CAPSULE ORAL 2 TIMES DAILY
Status: DISCONTINUED | OUTPATIENT
Start: 2024-01-01 | End: 2024-01-01 | Stop reason: HOSPADM

## 2024-01-01 RX ORDER — ACETAMINOPHEN 500 MG
500 TABLET ORAL EVERY 6 HOURS PRN
Qty: 10 TABLET | Refills: 0 | Status: SHIPPED | OUTPATIENT
Start: 2024-01-01

## 2024-01-01 RX ORDER — ASPIRIN 81 MG/1
81 TABLET ORAL WEEKLY
Status: DISCONTINUED | OUTPATIENT
Start: 2024-01-01 | End: 2024-01-01 | Stop reason: HOSPADM

## 2024-01-01 RX ORDER — HALOPERIDOL 5 MG/ML
2 INJECTION INTRAMUSCULAR ONCE
Status: COMPLETED | OUTPATIENT
Start: 2024-01-01 | End: 2024-01-01

## 2024-01-01 RX ORDER — ROSUVASTATIN CALCIUM 10 MG/1
10 TABLET, COATED ORAL DAILY
Status: DISCONTINUED | OUTPATIENT
Start: 2024-01-01 | End: 2024-01-01 | Stop reason: HOSPADM

## 2024-01-01 RX ORDER — CALCIUM CARBONATE 500 MG/1
2 TABLET, CHEWABLE ORAL EVERY 6 HOURS PRN
COMMUNITY

## 2024-01-01 RX ORDER — ASPIRIN 81 MG/1
81 TABLET ORAL DAILY
Status: DISCONTINUED | OUTPATIENT
Start: 2024-01-01 | End: 2024-01-01

## 2024-01-01 RX ORDER — ACETAMINOPHEN 325 MG/1
650 TABLET ORAL EVERY 4 HOURS PRN
Status: DISCONTINUED | OUTPATIENT
Start: 2024-01-01 | End: 2024-01-01 | Stop reason: HOSPADM

## 2024-01-01 RX ORDER — ONDANSETRON 4 MG/1
4 TABLET, ORALLY DISINTEGRATING ORAL EVERY 6 HOURS PRN
Status: DISCONTINUED | OUTPATIENT
Start: 2024-01-01 | End: 2024-01-01 | Stop reason: HOSPADM

## 2024-01-01 RX ORDER — OXYCODONE HYDROCHLORIDE 5 MG/1
5 TABLET ORAL EVERY 6 HOURS PRN
Qty: 12 TABLET | Refills: 0 | Status: SHIPPED | OUTPATIENT
Start: 2024-01-01 | End: 2024-01-01

## 2024-01-01 RX ORDER — ACETAMINOPHEN 500 MG
1000 TABLET ORAL EVERY 6 HOURS PRN
Status: DISCONTINUED | OUTPATIENT
Start: 2024-01-01 | End: 2024-01-01 | Stop reason: HOSPADM

## 2024-01-01 RX ORDER — ONDANSETRON 2 MG/ML
4 INJECTION INTRAMUSCULAR; INTRAVENOUS EVERY 6 HOURS PRN
Status: DISCONTINUED | OUTPATIENT
Start: 2024-01-01 | End: 2024-01-01 | Stop reason: HOSPADM

## 2024-01-01 RX ORDER — CARBOXYMETHYLCELLULOSE SODIUM 5 MG/ML
2 SOLUTION/ DROPS OPHTHALMIC 4 TIMES DAILY PRN
Status: DISCONTINUED | OUTPATIENT
Start: 2024-01-01 | End: 2024-01-01

## 2024-01-01 RX ORDER — ASPIRIN 81 MG/1
81 TABLET ORAL
COMMUNITY

## 2024-01-01 RX ORDER — QUETIAPINE FUMARATE 25 MG/1
25 TABLET, FILM COATED ORAL EVERY 6 HOURS PRN
Status: DISCONTINUED | OUTPATIENT
Start: 2024-01-01 | End: 2024-01-01 | Stop reason: HOSPADM

## 2024-01-01 RX ORDER — SENNOSIDES A AND B 8.6 MG/1
1 TABLET, FILM COATED ORAL 2 TIMES DAILY PRN
Qty: 6 TABLET | Refills: 0 | Status: SHIPPED | OUTPATIENT
Start: 2024-01-01

## 2024-01-01 RX ORDER — CALCIUM CARBONATE 500 MG/1
1000 TABLET, CHEWABLE ORAL EVERY 6 HOURS PRN
Status: DISCONTINUED | OUTPATIENT
Start: 2024-01-01 | End: 2024-01-01 | Stop reason: HOSPADM

## 2024-01-01 RX ORDER — ACETAMINOPHEN 650 MG/1
650 SUPPOSITORY RECTAL EVERY 4 HOURS PRN
Qty: 1 SUPPOSITORY | Refills: 0 | Status: SHIPPED | OUTPATIENT
Start: 2024-01-01

## 2024-01-01 RX ORDER — BISACODYL 10 MG
10 SUPPOSITORY, RECTAL RECTAL DAILY PRN
Status: DISCONTINUED | OUTPATIENT
Start: 2024-01-01 | End: 2024-01-01 | Stop reason: HOSPADM

## 2024-01-01 RX ORDER — HYDRALAZINE HYDROCHLORIDE 20 MG/ML
10 INJECTION INTRAMUSCULAR; INTRAVENOUS EVERY 4 HOURS PRN
Status: DISCONTINUED | OUTPATIENT
Start: 2024-01-01 | End: 2024-01-01 | Stop reason: HOSPADM

## 2024-01-01 RX ORDER — ACETAMINOPHEN 650 MG/1
650 SUPPOSITORY RECTAL EVERY 4 HOURS PRN
Status: DISCONTINUED | OUTPATIENT
Start: 2024-01-01 | End: 2024-01-01 | Stop reason: HOSPADM

## 2024-01-01 RX ORDER — AMLODIPINE BESYLATE 5 MG/1
5 TABLET ORAL DAILY
Status: DISCONTINUED | OUTPATIENT
Start: 2024-01-01 | End: 2024-01-01 | Stop reason: HOSPADM

## 2024-01-01 RX ORDER — LORAZEPAM 0.5 MG/1
0.5 TABLET ORAL EVERY 6 HOURS PRN
Qty: 8 TABLET | Refills: 0 | Status: SHIPPED | OUTPATIENT
Start: 2024-01-01

## 2024-01-01 RX ORDER — RIVASTIGMINE TARTRATE 1.5 MG/1
1.5 CAPSULE ORAL 2 TIMES DAILY
Status: DISCONTINUED | OUTPATIENT
Start: 2024-01-01 | End: 2024-01-01

## 2024-01-01 RX ORDER — DOCUSATE SODIUM 100 MG/1
100 CAPSULE, LIQUID FILLED ORAL 2 TIMES DAILY PRN
Status: DISCONTINUED | OUTPATIENT
Start: 2024-01-01 | End: 2024-01-01 | Stop reason: HOSPADM

## 2024-01-01 RX ORDER — SODIUM CHLORIDE, SODIUM LACTATE, POTASSIUM CHLORIDE, CALCIUM CHLORIDE 600; 310; 30; 20 MG/100ML; MG/100ML; MG/100ML; MG/100ML
INJECTION, SOLUTION INTRAVENOUS CONTINUOUS
Status: DISCONTINUED | OUTPATIENT
Start: 2024-01-01 | End: 2024-01-01

## 2024-01-01 RX ORDER — CARBOXYMETHYLCELLULOSE SODIUM 5 MG/ML
1-2 SOLUTION/ DROPS OPHTHALMIC 4 TIMES DAILY PRN
Status: DISCONTINUED | OUTPATIENT
Start: 2024-01-01 | End: 2024-01-01 | Stop reason: HOSPADM

## 2024-01-01 RX ORDER — MULTIPLE VITAMINS W/ MINERALS TAB 9MG-400MCG
1 TAB ORAL DAILY
Status: DISCONTINUED | OUTPATIENT
Start: 2024-01-01 | End: 2024-01-01 | Stop reason: HOSPADM

## 2024-01-01 RX ORDER — ACETAMINOPHEN 500 MG
500 TABLET ORAL EVERY 6 HOURS PRN
Qty: 10 TABLET | Refills: 0 | Status: SHIPPED | OUTPATIENT
Start: 2024-01-01 | End: 2024-01-01

## 2024-01-01 RX ORDER — SODIUM CHLORIDE 9 MG/ML
INJECTION, SOLUTION INTRAVENOUS ONCE
Status: COMPLETED | OUTPATIENT
Start: 2024-01-01 | End: 2024-01-01

## 2024-01-01 RX ORDER — HALOPERIDOL 5 MG/ML
2 INJECTION INTRAMUSCULAR EVERY 6 HOURS PRN
Status: DISCONTINUED | OUTPATIENT
Start: 2024-01-01 | End: 2024-01-01 | Stop reason: HOSPADM

## 2024-01-01 RX ORDER — ACETAMINOPHEN 325 MG/1
650 TABLET ORAL EVERY 4 HOURS PRN
Status: DISCONTINUED | OUTPATIENT
Start: 2024-01-01 | End: 2024-01-01

## 2024-01-01 RX ORDER — SODIUM CHLORIDE 9 MG/ML
INJECTION, SOLUTION INTRAVENOUS CONTINUOUS
Status: ACTIVE | OUTPATIENT
Start: 2024-01-01 | End: 2024-01-01

## 2024-01-01 RX ORDER — QUETIAPINE FUMARATE 25 MG/1
25 TABLET, FILM COATED ORAL EVERY 6 HOURS PRN
COMMUNITY

## 2024-01-01 RX ORDER — RIVASTIGMINE TARTRATE 1.5 MG/1
1.5 CAPSULE ORAL 2 TIMES DAILY
COMMUNITY

## 2024-01-01 RX ORDER — FAMOTIDINE 20 MG
1 TABLET ORAL DAILY
COMMUNITY

## 2024-01-01 RX ORDER — CARBOXYMETHYLCELLULOSE SODIUM 5 MG/ML
1-2 SOLUTION/ DROPS OPHTHALMIC 4 TIMES DAILY PRN
COMMUNITY

## 2024-01-01 RX ADMIN — HALOPERIDOL LACTATE 2 MG: 5 INJECTION, SOLUTION INTRAMUSCULAR at 03:13

## 2024-01-01 RX ADMIN — ROSUVASTATIN CALCIUM 10 MG: 10 TABLET, FILM COATED ORAL at 08:10

## 2024-01-01 RX ADMIN — QUETIAPINE FUMARATE 25 MG: 25 TABLET ORAL at 16:38

## 2024-01-01 RX ADMIN — ROSUVASTATIN CALCIUM 10 MG: 10 TABLET, FILM COATED ORAL at 10:36

## 2024-01-01 RX ADMIN — NETARSUDIL 1 DROP: 0.2 SOLUTION/ DROPS OPHTHALMIC; TOPICAL at 21:11

## 2024-01-01 RX ADMIN — Medication 1 TABLET: at 09:18

## 2024-01-01 RX ADMIN — RIVASTIGMINE TARTRATE 1.5 MG: 1.5 CAPSULE ORAL at 09:18

## 2024-01-01 RX ADMIN — DORZOLAMIDE HYDROCHLORIDE TIMOLOL MALEATE 1 DROP: 20; 5 SOLUTION/ DROPS OPHTHALMIC at 08:10

## 2024-01-01 RX ADMIN — HALOPERIDOL LACTATE 2 MG: 5 INJECTION, SOLUTION INTRAMUSCULAR at 23:38

## 2024-01-01 RX ADMIN — RIVASTIGMINE TARTRATE 1.5 MG: 1.5 CAPSULE ORAL at 07:45

## 2024-01-01 RX ADMIN — ASPIRIN 81 MG: 81 TABLET, COATED ORAL at 07:57

## 2024-01-01 RX ADMIN — DORZOLAMIDE HYDROCHLORIDE TIMOLOL MALEATE 1 DROP: 20; 5 SOLUTION/ DROPS OPHTHALMIC at 07:47

## 2024-01-01 RX ADMIN — HALOPERIDOL LACTATE 2 MG: 5 INJECTION, SOLUTION INTRAMUSCULAR at 16:49

## 2024-01-01 RX ADMIN — ROSUVASTATIN CALCIUM 10 MG: 10 TABLET, FILM COATED ORAL at 08:47

## 2024-01-01 RX ADMIN — ASPIRIN 81 MG: 81 TABLET, COATED ORAL at 08:47

## 2024-01-01 RX ADMIN — AMLODIPINE BESYLATE 5 MG: 5 TABLET ORAL at 07:46

## 2024-01-01 RX ADMIN — QUETIAPINE FUMARATE 25 MG: 25 TABLET ORAL at 00:41

## 2024-01-01 RX ADMIN — RIVASTIGMINE TARTRATE 1.5 MG: 1.5 CAPSULE ORAL at 20:32

## 2024-01-01 RX ADMIN — ROSUVASTATIN CALCIUM 10 MG: 10 TABLET, FILM COATED ORAL at 08:28

## 2024-01-01 RX ADMIN — DORZOLAMIDE HYDROCHLORIDE TIMOLOL MALEATE 1 DROP: 20; 5 SOLUTION/ DROPS OPHTHALMIC at 10:41

## 2024-01-01 RX ADMIN — AMLODIPINE BESYLATE 5 MG: 5 TABLET ORAL at 09:01

## 2024-01-01 RX ADMIN — QUETIAPINE FUMARATE 25 MG: 25 TABLET ORAL at 08:10

## 2024-01-01 RX ADMIN — ROSUVASTATIN CALCIUM 10 MG: 10 TABLET, FILM COATED ORAL at 12:44

## 2024-01-01 RX ADMIN — RIVASTIGMINE TARTRATE 1.5 MG: 1.5 CAPSULE ORAL at 07:49

## 2024-01-01 RX ADMIN — DORZOLAMIDE HYDROCHLORIDE TIMOLOL MALEATE 1 DROP: 20; 5 SOLUTION/ DROPS OPHTHALMIC at 08:48

## 2024-01-01 RX ADMIN — NETARSUDIL 1 DROP: 0.2 SOLUTION/ DROPS OPHTHALMIC; TOPICAL at 21:50

## 2024-01-01 RX ADMIN — QUETIAPINE FUMARATE 25 MG: 25 TABLET ORAL at 11:40

## 2024-01-01 RX ADMIN — SODIUM CHLORIDE: 9 INJECTION, SOLUTION INTRAVENOUS at 02:01

## 2024-01-01 RX ADMIN — NETARSUDIL 1 DROP: 0.2 SOLUTION/ DROPS OPHTHALMIC; TOPICAL at 21:31

## 2024-01-01 RX ADMIN — QUETIAPINE FUMARATE 25 MG: 25 TABLET ORAL at 02:45

## 2024-01-01 RX ADMIN — DORZOLAMIDE HYDROCHLORIDE TIMOLOL MALEATE 1 DROP: 20; 5 SOLUTION/ DROPS OPHTHALMIC at 08:13

## 2024-01-01 RX ADMIN — AMLODIPINE BESYLATE 5 MG: 5 TABLET ORAL at 07:45

## 2024-01-01 RX ADMIN — DORZOLAMIDE HYDROCHLORIDE TIMOLOL MALEATE 1 DROP: 20; 5 SOLUTION/ DROPS OPHTHALMIC at 21:50

## 2024-01-01 RX ADMIN — QUETIAPINE FUMARATE 25 MG: 25 TABLET ORAL at 06:53

## 2024-01-01 RX ADMIN — DORZOLAMIDE HYDROCHLORIDE TIMOLOL MALEATE 1 DROP: 20; 5 SOLUTION/ DROPS OPHTHALMIC at 20:32

## 2024-01-01 RX ADMIN — HALOPERIDOL LACTATE 2 MG: 5 INJECTION, SOLUTION INTRAMUSCULAR at 02:02

## 2024-01-01 RX ADMIN — BIMATOPROST 1 DROP: 0.1 SOLUTION/ DROPS OPHTHALMIC at 22:37

## 2024-01-01 RX ADMIN — ROSUVASTATIN CALCIUM 10 MG: 10 TABLET, FILM COATED ORAL at 07:49

## 2024-01-01 RX ADMIN — BIMATOPROST 1 DROP: 0.1 SOLUTION/ DROPS OPHTHALMIC at 21:44

## 2024-01-01 RX ADMIN — AMLODIPINE BESYLATE 5 MG: 5 TABLET ORAL at 09:18

## 2024-01-01 RX ADMIN — QUETIAPINE FUMARATE 12.5 MG: 25 TABLET ORAL at 08:07

## 2024-01-01 RX ADMIN — Medication 25 MCG: at 07:45

## 2024-01-01 RX ADMIN — RIVASTIGMINE TARTRATE 1.5 MG: 1.5 CAPSULE ORAL at 08:30

## 2024-01-01 RX ADMIN — DORZOLAMIDE HYDROCHLORIDE TIMOLOL MALEATE 1 DROP: 20; 5 SOLUTION/ DROPS OPHTHALMIC at 09:22

## 2024-01-01 RX ADMIN — BIMATOPROST 1 DROP: 0.1 SOLUTION/ DROPS OPHTHALMIC at 21:42

## 2024-01-01 RX ADMIN — DORZOLAMIDE HYDROCHLORIDE TIMOLOL MALEATE 1 DROP: 20; 5 SOLUTION/ DROPS OPHTHALMIC at 20:14

## 2024-01-01 RX ADMIN — AMLODIPINE BESYLATE 5 MG: 5 TABLET ORAL at 10:36

## 2024-01-01 RX ADMIN — RIVASTIGMINE TARTRATE 1.5 MG: 1.5 CAPSULE ORAL at 12:43

## 2024-01-01 RX ADMIN — DORZOLAMIDE HYDROCHLORIDE TIMOLOL MALEATE 1 DROP: 20; 5 SOLUTION/ DROPS OPHTHALMIC at 20:38

## 2024-01-01 RX ADMIN — HALOPERIDOL LACTATE 2 MG: 5 INJECTION, SOLUTION INTRAMUSCULAR at 18:11

## 2024-01-01 RX ADMIN — DORZOLAMIDE HYDROCHLORIDE TIMOLOL MALEATE 1 DROP: 20; 5 SOLUTION/ DROPS OPHTHALMIC at 12:43

## 2024-01-01 RX ADMIN — Medication 25 MCG: at 10:36

## 2024-01-01 RX ADMIN — DORZOLAMIDE HYDROCHLORIDE TIMOLOL MALEATE 1 DROP: 20; 5 SOLUTION/ DROPS OPHTHALMIC at 19:35

## 2024-01-01 RX ADMIN — ROSUVASTATIN CALCIUM 10 MG: 10 TABLET, FILM COATED ORAL at 09:18

## 2024-01-01 RX ADMIN — Medication 25 MCG: at 07:49

## 2024-01-01 RX ADMIN — DORZOLAMIDE HYDROCHLORIDE TIMOLOL MALEATE 1 DROP: 20; 5 SOLUTION/ DROPS OPHTHALMIC at 09:01

## 2024-01-01 RX ADMIN — Medication 1 TABLET: at 10:35

## 2024-01-01 RX ADMIN — BIMATOPROST 1 DROP: 0.1 SOLUTION/ DROPS OPHTHALMIC at 21:11

## 2024-01-01 RX ADMIN — ROSUVASTATIN CALCIUM 10 MG: 10 TABLET, FILM COATED ORAL at 09:00

## 2024-01-01 RX ADMIN — DORZOLAMIDE HYDROCHLORIDE TIMOLOL MALEATE 1 DROP: 20; 5 SOLUTION/ DROPS OPHTHALMIC at 20:57

## 2024-01-01 RX ADMIN — RIVASTIGMINE TARTRATE 1.5 MG: 1.5 CAPSULE ORAL at 19:44

## 2024-01-01 RX ADMIN — BIMATOPROST 1 DROP: 0.1 SOLUTION/ DROPS OPHTHALMIC at 21:31

## 2024-01-01 RX ADMIN — SODIUM CHLORIDE: 9 INJECTION, SOLUTION INTRAVENOUS at 16:15

## 2024-01-01 RX ADMIN — RIVASTIGMINE TARTRATE 1.5 MG: 1.5 CAPSULE ORAL at 20:33

## 2024-01-01 RX ADMIN — NETARSUDIL 1 DROP: 0.2 SOLUTION/ DROPS OPHTHALMIC; TOPICAL at 21:43

## 2024-01-01 RX ADMIN — BIMATOPROST 1 DROP: 0.1 SOLUTION/ DROPS OPHTHALMIC at 21:50

## 2024-01-01 RX ADMIN — Medication 1 TABLET: at 07:49

## 2024-01-01 RX ADMIN — DORZOLAMIDE HYDROCHLORIDE TIMOLOL MALEATE 1 DROP: 20; 5 SOLUTION/ DROPS OPHTHALMIC at 07:49

## 2024-01-01 RX ADMIN — AMLODIPINE BESYLATE 5 MG: 5 TABLET ORAL at 08:10

## 2024-01-01 RX ADMIN — Medication 1 TABLET: at 07:45

## 2024-01-01 RX ADMIN — AMLODIPINE BESYLATE 5 MG: 5 TABLET ORAL at 11:40

## 2024-01-01 RX ADMIN — AMLODIPINE BESYLATE 5 MG: 5 TABLET ORAL at 07:49

## 2024-01-01 RX ADMIN — Medication 25 MCG: at 08:28

## 2024-01-01 RX ADMIN — HALOPERIDOL LACTATE 2 MG: 5 INJECTION, SOLUTION INTRAMUSCULAR at 17:24

## 2024-01-01 RX ADMIN — ROSUVASTATIN CALCIUM 10 MG: 10 TABLET, FILM COATED ORAL at 07:45

## 2024-01-01 RX ADMIN — AMLODIPINE BESYLATE 5 MG: 5 TABLET ORAL at 08:47

## 2024-01-01 RX ADMIN — HALOPERIDOL LACTATE 2 MG: 5 INJECTION, SOLUTION INTRAMUSCULAR at 17:23

## 2024-01-01 RX ADMIN — DORZOLAMIDE HYDROCHLORIDE TIMOLOL MALEATE 1 DROP: 20; 5 SOLUTION/ DROPS OPHTHALMIC at 20:33

## 2024-01-01 RX ADMIN — NETARSUDIL 1 DROP: 0.2 SOLUTION/ DROPS OPHTHALMIC; TOPICAL at 22:27

## 2024-01-01 RX ADMIN — QUETIAPINE FUMARATE 25 MG: 25 TABLET ORAL at 01:56

## 2024-01-01 RX ADMIN — QUETIAPINE FUMARATE 25 MG: 25 TABLET ORAL at 21:51

## 2024-01-01 RX ADMIN — QUETIAPINE FUMARATE 25 MG: 25 TABLET ORAL at 07:46

## 2024-01-01 RX ADMIN — HALOPERIDOL LACTATE 2 MG: 5 INJECTION, SOLUTION INTRAMUSCULAR at 05:44

## 2024-01-01 RX ADMIN — RIVASTIGMINE TARTRATE 1.5 MG: 1.5 CAPSULE ORAL at 10:35

## 2024-01-01 RX ADMIN — DORZOLAMIDE HYDROCHLORIDE TIMOLOL MALEATE 1 DROP: 20; 5 SOLUTION/ DROPS OPHTHALMIC at 21:10

## 2024-01-01 RX ADMIN — BIMATOPROST 1 DROP: 0.1 SOLUTION/ DROPS OPHTHALMIC at 21:48

## 2024-01-01 RX ADMIN — ACETAMINOPHEN 650 MG: 325 TABLET ORAL at 20:54

## 2024-01-01 RX ADMIN — NETARSUDIL 1 DROP: 0.2 SOLUTION/ DROPS OPHTHALMIC; TOPICAL at 21:48

## 2024-01-01 RX ADMIN — DORZOLAMIDE HYDROCHLORIDE TIMOLOL MALEATE 1 DROP: 20; 5 SOLUTION/ DROPS OPHTHALMIC at 07:45

## 2024-01-01 RX ADMIN — ROSUVASTATIN CALCIUM 10 MG: 10 TABLET, FILM COATED ORAL at 07:46

## 2024-01-01 RX ADMIN — BIMATOPROST 1 DROP: 0.1 SOLUTION/ DROPS OPHTHALMIC at 22:27

## 2024-01-01 RX ADMIN — Medication 25 MCG: at 09:18

## 2024-01-01 RX ADMIN — Medication 1 TABLET: at 08:28

## 2024-01-01 RX ADMIN — RIVASTIGMINE TARTRATE 1.5 MG: 1.5 CAPSULE ORAL at 21:34

## 2024-01-01 RX ADMIN — NETARSUDIL 1 DROP: 0.2 SOLUTION/ DROPS OPHTHALMIC; TOPICAL at 22:37

## 2024-01-01 RX ADMIN — QUETIAPINE FUMARATE 12.5 MG: 25 TABLET ORAL at 03:59

## 2024-01-01 RX ADMIN — DORZOLAMIDE HYDROCHLORIDE TIMOLOL MALEATE 1 DROP: 20; 5 SOLUTION/ DROPS OPHTHALMIC at 21:42

## 2024-01-01 RX ADMIN — AMLODIPINE BESYLATE 5 MG: 5 TABLET ORAL at 08:28

## 2024-01-01 ASSESSMENT — ACTIVITIES OF DAILY LIVING (ADL)
ADLS_ACUITY_SCORE: 71
ADLS_ACUITY_SCORE: 66
ADLS_ACUITY_SCORE: 65
ADLS_ACUITY_SCORE: 69
ADLS_ACUITY_SCORE: 70
ADLS_ACUITY_SCORE: 73
ADLS_ACUITY_SCORE: 67
ADLS_ACUITY_SCORE: 65
ADLS_ACUITY_SCORE: 39
ADLS_ACUITY_SCORE: 38
ADLS_ACUITY_SCORE: 70
ADLS_ACUITY_SCORE: 49
ADLS_ACUITY_SCORE: 38
ADLS_ACUITY_SCORE: 38
ADLS_ACUITY_SCORE: 73
ADLS_ACUITY_SCORE: 66
ADLS_ACUITY_SCORE: 65
ADLS_ACUITY_SCORE: 65
ADLS_ACUITY_SCORE: 69
ADLS_ACUITY_SCORE: 38
ADLS_ACUITY_SCORE: 39
ADLS_ACUITY_SCORE: 68
ADLS_ACUITY_SCORE: 67
ADLS_ACUITY_SCORE: 65
ADLS_ACUITY_SCORE: 73
ADLS_ACUITY_SCORE: 66
ADLS_ACUITY_SCORE: 69
ADLS_ACUITY_SCORE: 73
ADLS_ACUITY_SCORE: 71
ADLS_ACUITY_SCORE: 73
ADLS_ACUITY_SCORE: 66
ADLS_ACUITY_SCORE: 73
ADLS_ACUITY_SCORE: 70
ADLS_ACUITY_SCORE: 66
ADLS_ACUITY_SCORE: 51
ADLS_ACUITY_SCORE: 71
ADLS_ACUITY_SCORE: 69
ADLS_ACUITY_SCORE: 65
ADLS_ACUITY_SCORE: 65
ADLS_ACUITY_SCORE: 66
ADLS_ACUITY_SCORE: 71
ADLS_ACUITY_SCORE: 65
ADLS_ACUITY_SCORE: 66
ADLS_ACUITY_SCORE: 71
ADLS_ACUITY_SCORE: 69
ADLS_ACUITY_SCORE: 70
ADLS_ACUITY_SCORE: 69
ADLS_ACUITY_SCORE: 65
ADLS_ACUITY_SCORE: 39
ADLS_ACUITY_SCORE: 65
ADLS_ACUITY_SCORE: 69
ADLS_ACUITY_SCORE: 73
ADLS_ACUITY_SCORE: 73
ADLS_ACUITY_SCORE: 70
ADLS_ACUITY_SCORE: 69
ADLS_ACUITY_SCORE: 70
ADLS_ACUITY_SCORE: 70
ADLS_ACUITY_SCORE: 69
ADLS_ACUITY_SCORE: 69
ADLS_ACUITY_SCORE: 71
ADLS_ACUITY_SCORE: 51
ADLS_ACUITY_SCORE: 73
ADLS_ACUITY_SCORE: 71
ADLS_ACUITY_SCORE: 67
ADLS_ACUITY_SCORE: 70
ADLS_ACUITY_SCORE: 67
ADLS_ACUITY_SCORE: 71
ADLS_ACUITY_SCORE: 73
ADLS_ACUITY_SCORE: 38
ADLS_ACUITY_SCORE: 69
ADLS_ACUITY_SCORE: 69
ADLS_ACUITY_SCORE: 67
ADLS_ACUITY_SCORE: 69
ADLS_ACUITY_SCORE: 65
ADLS_ACUITY_SCORE: 73
ADLS_ACUITY_SCORE: 73
ADLS_ACUITY_SCORE: 69
ADLS_ACUITY_SCORE: 69
ADLS_ACUITY_SCORE: 65
ADLS_ACUITY_SCORE: 65
ADLS_ACUITY_SCORE: 67
ADLS_ACUITY_SCORE: 73
ADLS_ACUITY_SCORE: 69
ADLS_ACUITY_SCORE: 69
ADLS_ACUITY_SCORE: 38
ADLS_ACUITY_SCORE: 69
ADLS_ACUITY_SCORE: 73
ADLS_ACUITY_SCORE: 69
ADLS_ACUITY_SCORE: 66
ADLS_ACUITY_SCORE: 69
ADLS_ACUITY_SCORE: 39
ADLS_ACUITY_SCORE: 70
ADLS_ACUITY_SCORE: 73
ADLS_ACUITY_SCORE: 38
ADLS_ACUITY_SCORE: 71
ADLS_ACUITY_SCORE: 38
ADLS_ACUITY_SCORE: 68
ADLS_ACUITY_SCORE: 70
ADLS_ACUITY_SCORE: 38
ADLS_ACUITY_SCORE: 38
ADLS_ACUITY_SCORE: 51
ADLS_ACUITY_SCORE: 65
ADLS_ACUITY_SCORE: 66
ADLS_ACUITY_SCORE: 70
ADLS_ACUITY_SCORE: 69
ADLS_ACUITY_SCORE: 70
ADLS_ACUITY_SCORE: 73
ADLS_ACUITY_SCORE: 38
ADLS_ACUITY_SCORE: 67
ADLS_ACUITY_SCORE: 66
ADLS_ACUITY_SCORE: 69
ADLS_ACUITY_SCORE: 71
ADLS_ACUITY_SCORE: 70
ADLS_ACUITY_SCORE: 67
ADLS_ACUITY_SCORE: 67
ADLS_ACUITY_SCORE: 73
ADLS_ACUITY_SCORE: 70
ADLS_ACUITY_SCORE: 69
ADLS_ACUITY_SCORE: 71
ADLS_ACUITY_SCORE: 38
ADLS_ACUITY_SCORE: 66
ADLS_ACUITY_SCORE: 55
ADLS_ACUITY_SCORE: 73
ADLS_ACUITY_SCORE: 69
ADLS_ACUITY_SCORE: 71
ADLS_ACUITY_SCORE: 73
ADLS_ACUITY_SCORE: 73
ADLS_ACUITY_SCORE: 69
ADLS_ACUITY_SCORE: 69
ADLS_ACUITY_SCORE: 73
ADLS_ACUITY_SCORE: 65
ADLS_ACUITY_SCORE: 69
ADLS_ACUITY_SCORE: 38
ADLS_ACUITY_SCORE: 66
ADLS_ACUITY_SCORE: 67
ADLS_ACUITY_SCORE: 69
ADLS_ACUITY_SCORE: 65
ADLS_ACUITY_SCORE: 69
ADLS_ACUITY_SCORE: 38
ADLS_ACUITY_SCORE: 73
ADLS_ACUITY_SCORE: 70
ADLS_ACUITY_SCORE: 38
ADLS_ACUITY_SCORE: 65
ADLS_ACUITY_SCORE: 73
ADLS_ACUITY_SCORE: 66
ADLS_ACUITY_SCORE: 73
ADLS_ACUITY_SCORE: 66
ADLS_ACUITY_SCORE: 65
ADLS_ACUITY_SCORE: 73
ADLS_ACUITY_SCORE: 69
ADLS_ACUITY_SCORE: 73
ADLS_ACUITY_SCORE: 38
ADLS_ACUITY_SCORE: 38
ADLS_ACUITY_SCORE: 66
ADLS_ACUITY_SCORE: 73
ADLS_ACUITY_SCORE: 38
ADLS_ACUITY_SCORE: 71
ADLS_ACUITY_SCORE: 38
ADLS_ACUITY_SCORE: 69
ADLS_ACUITY_SCORE: 70
ADLS_ACUITY_SCORE: 65
ADLS_ACUITY_SCORE: 73
ADLS_ACUITY_SCORE: 39
ADLS_ACUITY_SCORE: 70
ADLS_ACUITY_SCORE: 65
ADLS_ACUITY_SCORE: 70
ADLS_ACUITY_SCORE: 38
ADLS_ACUITY_SCORE: 65
ADLS_ACUITY_SCORE: 38
ADLS_ACUITY_SCORE: 71
ADLS_ACUITY_SCORE: 51
ADLS_ACUITY_SCORE: 69
ADLS_ACUITY_SCORE: 69
ADLS_ACUITY_SCORE: 73
ADLS_ACUITY_SCORE: 73
ADLS_ACUITY_SCORE: 66
ADLS_ACUITY_SCORE: 71
ADLS_ACUITY_SCORE: 38
ADLS_ACUITY_SCORE: 66
ADLS_ACUITY_SCORE: 39
ADLS_ACUITY_SCORE: 71
ADLS_ACUITY_SCORE: 73
ADLS_ACUITY_SCORE: 69
ADLS_ACUITY_SCORE: 67
ADLS_ACUITY_SCORE: 67
ADLS_ACUITY_SCORE: 71
ADLS_ACUITY_SCORE: 51
ADLS_ACUITY_SCORE: 70
ADLS_ACUITY_SCORE: 66
ADLS_ACUITY_SCORE: 38
ADLS_ACUITY_SCORE: 69
ADLS_ACUITY_SCORE: 71
ADLS_ACUITY_SCORE: 71
ADLS_ACUITY_SCORE: 38
ADLS_ACUITY_SCORE: 65
ADLS_ACUITY_SCORE: 67
ADLS_ACUITY_SCORE: 66
ADLS_ACUITY_SCORE: 38
ADLS_ACUITY_SCORE: 39
ADLS_ACUITY_SCORE: 73
ADLS_ACUITY_SCORE: 73
ADLS_ACUITY_SCORE: 71
ADLS_ACUITY_SCORE: 71
ADLS_ACUITY_SCORE: 69
ADLS_ACUITY_SCORE: 65
ADLS_ACUITY_SCORE: 38
ADLS_ACUITY_SCORE: 69
ADLS_ACUITY_SCORE: 38
ADLS_ACUITY_SCORE: 73
ADLS_ACUITY_SCORE: 73
ADLS_ACUITY_SCORE: 69
ADLS_ACUITY_SCORE: 73
ADLS_ACUITY_SCORE: 73
ADLS_ACUITY_SCORE: 69
ADLS_ACUITY_SCORE: 65
ADLS_ACUITY_SCORE: 66
ADLS_ACUITY_SCORE: 69
ADLS_ACUITY_SCORE: 69
ADLS_ACUITY_SCORE: 73
ADLS_ACUITY_SCORE: 69
ADLS_ACUITY_SCORE: 39
ADLS_ACUITY_SCORE: 73
ADLS_ACUITY_SCORE: 73
ADLS_ACUITY_SCORE: 71
ADLS_ACUITY_SCORE: 73
ADLS_ACUITY_SCORE: 73
ADLS_ACUITY_SCORE: 65
ADLS_ACUITY_SCORE: 38
ADLS_ACUITY_SCORE: 69
ADLS_ACUITY_SCORE: 73
ADLS_ACUITY_SCORE: 69
ADLS_ACUITY_SCORE: 73
ADLS_ACUITY_SCORE: 73
ADLS_ACUITY_SCORE: 69
ADLS_ACUITY_SCORE: 73
ADLS_ACUITY_SCORE: 70
ADLS_ACUITY_SCORE: 69
ADLS_ACUITY_SCORE: 71
ADLS_ACUITY_SCORE: 70
ADLS_ACUITY_SCORE: 69
ADLS_ACUITY_SCORE: 71
ADLS_ACUITY_SCORE: 73
ADLS_ACUITY_SCORE: 70
ADLS_ACUITY_SCORE: 71
ADLS_ACUITY_SCORE: 69
ADLS_ACUITY_SCORE: 69
ADLS_ACUITY_SCORE: 73
ADLS_ACUITY_SCORE: 69
ADLS_ACUITY_SCORE: 38
ADLS_ACUITY_SCORE: 65
ADLS_ACUITY_SCORE: 69
ADLS_ACUITY_SCORE: 73
ADLS_ACUITY_SCORE: 38
ADLS_ACUITY_SCORE: 73
ADLS_ACUITY_SCORE: 70
ADLS_ACUITY_SCORE: 73
ADLS_ACUITY_SCORE: 71
ADLS_ACUITY_SCORE: 38
ADLS_ACUITY_SCORE: 69
ADLS_ACUITY_SCORE: 69
ADLS_ACUITY_SCORE: 67
ADLS_ACUITY_SCORE: 67
ADLS_ACUITY_SCORE: 38

## 2024-01-01 ASSESSMENT — COLUMBIA-SUICIDE SEVERITY RATING SCALE - C-SSRS: IS THE PATIENT NOT ABLE TO COMPLETE C-SSRS: UNABLE TO VERBALIZE

## 2024-10-04 NOTE — ED TRIAGE NOTES
[facility], pt found on bed c/o gen weakness, EMS noted HR of 42 bpm, 2 mg of atropine given, HR increased to 55 bpm, EMS noted 1st degree AV block on EKG, ABCD intact.

## 2024-10-04 NOTE — ED NOTES
Spoke with Nissa Nursing Staff for patient at [facility] - RN went over discharge instructions with Nissa.

## 2024-10-04 NOTE — ED NOTES
Pt requires sitter at this time. Charge nurse requested sitter via ANS and staffing. Curtain left open, yellow fall prevention socks placed, side rails up, bed in lowest position with wheels locked, personal items within arms reach, bed/chair alarm placed until sitter available.

## 2024-10-04 NOTE — ED NOTES
Bed: ED05  Expected date:   Expected time:   Means of arrival:   Comments:  431 EMS: 89M heart block here now

## 2024-10-04 NOTE — ED PROVIDER NOTES
Emergency Department Note      History of Present Illness     Chief Complaint   Bradycardia and Generalized Weakness      HPI   Suleiman Spain is a 89 year old male with history of type 2 diabetes mellitus, dementia, and stroke who presents to the ED via EMS for evaluation of bradycardia and generalized weakness. Patient is a poor historian due to [place] and blindness. EMS reports the patient was found by staff in bed at 1100 complaining of generalized weakness. EMS noted bradycardia at 42 bpm and possible 1st degree heart block on 12-lead. EMS administered 1 mg atropine which improved his rate to 60's and then it settled in the high 50's. He received another 1 mg atropine without changes.  systolic, 98% on RA, and . 18G in the left AC. On ED arrival, patient denies any weakness and states he does not feel much different from baseline. He denies dizziness or nausea. He does endorse a dry mouth. Patient is [place] bilaterally and blind in his left eye.    Independent Historian   EMS as detailed above.    Review of External Notes       Past Medical History     Medical History and Problem List   Type 2 diabetes mellitus with diabetic neuropathy  Severe cognitive impairment  Dementia  Hyperlipidemia  Hypertension  GERD  Cervicalgia  Stroke  Presbycusis    Medications   Amlodipine  Aspirin 81 mg  Rivastigmine  Rosuvastatin    Surgical History   Cataract extraction  Scleral fistulization, left  Tonsillectomy    Physical Exam     Patient Vitals for the past 24 hrs:   BP Temp Temp src Pulse Resp SpO2   10/04/24 1300 (!) 144/71 97.6  F (36.4  C) Oral 52 16 96 %   10/04/24 1245 (!) 144/64 -- -- 50 19 94 %   10/04/24 1230 (!) 140/99 -- -- 51 18 99 %     Physical Exam  General: Resting on the ED bed.  Patient is very hard of hearing.    He notes that he is functionally blind in the left eye.  Head:  The scalp, face, and head appear normal  Eyes:  The pupils are equal, round, and reactive to light    There is no  nystagmus    Extraocular muscles are intact    Conjunctivae and sclerae are normal  ENT:    The nose is normal    Pinnae are normal    The oropharynx is normal  Neck:  Normal range of motion    There is no rigidity noted  CV:  The patient has a bradycardic rate in the low 50s.  This is sinus bradycardia.  There is no evidence of first-degree, second-degree, or third-degree heart block.  There is no significant ectopy.  It appears that this is a chronic situation for this patient.  Normal underlying rhythm     Normal S1/S2    No pathological murmur detected  Resp:  Lungs are clear    There is no tachypnea    Non-labored    No rales    No wheezing   GI:  Abdomen is soft, there is no rigidity    No distension/tympani    No rebound tenderness     Non-surgical without peritoneal features at this time    There is no areas of focal abdominal tenderness.  MS:  Normal muscular tone    Symmetric motor strength    No major joint effusions    No asymmetric leg swelling    No calf tenderness    There is no significant swelling in the extremities at this time.  Skin:  No rash or acute skin lesions noted  Neuro:  Speech is normal and fluent, there is no aphasia    No motor deficits    Cranial nerves are intact  Psych: Awake. Alert.      Normal affect  Appropriate interactions            Diagnostics     Lab Results   Labs Ordered and Resulted from Time of ED Arrival to Time of ED Departure   COMPREHENSIVE METABOLIC PANEL - Abnormal       Result Value    Sodium 141      Potassium 4.0      Carbon Dioxide (CO2) 24      Anion Gap 9      Urea Nitrogen 15.4      Creatinine 0.79      GFR Estimate 83      Calcium 8.1 (*)     Chloride 108 (*)     Glucose 93      Alkaline Phosphatase 88      AST 18      ALT 11      Protein Total 5.6 (*)     Albumin 3.3 (*)     Bilirubin Total 0.4     TROPONIN T, HIGH SENSITIVITY - Normal    Troponin T, High Sensitivity 14     CBC WITH PLATELETS AND DIFFERENTIAL    WBC Count 8.0      RBC Count 4.93       Hemoglobin 14.8      Hematocrit 45.8      MCV 93      MCH 30.0      MCHC 32.3      RDW 12.7      Platelet Count 200      % Neutrophils 57      % Lymphocytes 24      % Monocytes 12      % Eosinophils 6      % Basophils 1      % Immature Granulocytes 0      NRBCs per 100 WBC 0      Absolute Neutrophils 4.6      Absolute Lymphocytes 1.9      Absolute Monocytes 0.9      Absolute Eosinophils 0.5      Absolute Basophils 0.1      Absolute Immature Granulocytes 0.0      Absolute NRBCs 0.0     ROUTINE UA WITH MICROSCOPIC REFLEX TO CULTURE   TROPONIN T, HIGH SENSITIVITY       Imaging   No orders to display       EKG   ECG taken at 1218, ECG read at 1226  Sinus bradycardia  Otherwise normal ECG   No significant change as compared to prior, dated 11/3/2022.  Rate 53 bpm. NJ interval 192 ms. QRS duration 90 ms. QT/QTc 446/418 ms. P-R-T axes 80 -14 7.    Independent Interpretation   None    ED Course      Medications Administered   Medications - No data to display    Procedures   Procedures     Discussion of Management   None    ED Course   ED Course as of 10/04/24 1323   Fri Oct 04, 2024   1222 I obtained history and examined the patient as noted above.   3:56 PM  Spoke with the patient's son Sonali.  He confirmed that the patient's hearing aids are currently in the shop.  He also confirms that he recently had some testing performed and he did not perform well on that and the diagnosis is likely consistent with dementia.  He and his sister are trying to get the patient into memory care where the patient's wife currently resides at the [facility] center in [place].      Additional Documentation  None    Medical Decision Making / Diagnosis     CMS Diagnoses: None    MIPS       None    MDM   Suleiman Spain is a 89 year old male was sent to the emergency department for generalized weakness.  They noticed that his heart rate was in the 40s and thought he should come to the emergency department for an assessment.  The patient does not  take any beta-blockers or calcium channel blockers systemically orally.  He does use a beta-blocker eyedrop.  The patient is not having any dizziness, or hypotension.  The patient has no complaints here in the emergency department.  His twelve-lead EKG is normal, there is no evidence of any type of heart block.  His twelve-lead electrocardiogram is similar to that obtained in 2022.  Given the patient's normal physical exam (with the exception of severe sensorineural hearing loss and dementia), and normal screening laboratories, he is safe to return back to the care facility.  The patient clearly has worsening and severe cognitive decline, and may even be experiencing some dementia based hallucinations.  The nurse saw him a few times pointing toward the door and looking around.  It is difficult to know because the patient also has blindness in the left eye.  I discussed this with the family and we will send him back to the care facility and they will continue working on trying to get him into memory/dementia care.  There is no indication for hospital admission at this time.    The patient did have a transient episode where he could not urinate and was noted to have 500 cc of urinary retention, a Linder catheter was placed and drained out 600 cc of urine.  There is no evidence of infection.  The patient did not want the catheter to remain and was pulling at it so we did remove it.      Disposition   The patient was discharged.     Diagnosis     ICD-10-CM    1. Weakness generalized  R53.1       2. Urinary retention  R33.9       3. History of dementia  Z86.59              Scribe Disclosure:  I, Tereza Kelloggmaria g, am serving as a scribe at 1:24 PM on 10/4/2024 to document services personally performed by Young Puckett MD based on my observations and the provider's statements to me.        Young Puckett MD  10/04/24 5402       Young Puckett MD  10/04/24 9548

## 2024-10-04 NOTE — ED NOTES
Per Son Sonali pt in Memory care room 217 at [facility] - Son okay with [facility] stretcher back to facility

## 2024-10-04 NOTE — DISCHARGE INSTRUCTIONS
Maintain a normal level of hydration.  Continued to eat meals normally.  No life-threatening etiologies or causes for your generalized weakness were detected in the emergency department.

## 2024-10-05 PROBLEM — R53.1 GENERALIZED WEAKNESS: Status: ACTIVE | Noted: 2024-01-01

## 2024-10-05 NOTE — ED PROVIDER NOTES
Emergency Department Note      History of Present Illness     Chief Complaint   Generalized Weakness      HPI   Suleiman Spain is a 89 year old male with history of stroke, dementia, hypertension, hyperlipidemia, and type 2 diabetes who presents with generalized weakness. The patient was discharged from the emergency department this evening to his memory care facility, where the staff said that the patient was much weaker than baseline and were uncomfortable keeping him there. EMS personnel were unable to get the care facility to sign to receive the patient, so he was returned to the ED for further evaluation and admission. The patient denies pain anywhere but otherwise provides no history, as he is hard of hearing in both ears.    Independent Historian   EMS as detailed above.    Review of External Notes   I reviewed the primary care visit family medicine note from 9/19/2024.  Patient has had additional episodes of confusion with some agitation and aggression.  A cognitive evaluation which showed a MoCA of 8 out of 30 was consistent with severe cognitive impairment.  He has an appointment with neurology in December.  He had been prescribed Seroquel to use as needed for agitation at his facility.  He had not had any agitation episodes over the previous 10 days he was prescribed Exelon.  An MRI of the brain was ordered.    I reviewed the emergency department visit note from earlier in the day.  Patient was sent to the emergency department with generalized weakness and was noted to be bradycardic per EMS and treated with atropine.  Patient is hard of hearing bilaterally and blind in his left eye.  A thorough and full workup was completed.  Bradycardia was asymptomatic.  Laboratory studies were reassuring.  Urinalysis was bland without signs of infection.  A head CT was negative.  The patient did have an episode of urinary retention that was treated with straight cath x 1.  Family and patient wished to be discharged  home and family was comfortable with this.  Patient was treated with Haldol 2 mg IV at 1650 and at 1725.    Past Medical History     Medical History and Problem List   Central retinal vein occlusion of left eye  GERD  Hypertension  Hyperlipidemia  Pseudophakia (B)  PVD  Type 2 diabetes  Glaucoma (B)  Stroke    Medications   Norvasc  Aspirin 81 mg  Lumigan  Cosopt  Rhopressa  Ultram  Seroquel  Methylprednisolone    Surgical History   Cataract extraction  Scleral fistulization (L)  Tonsillectomy    Physical Exam     Patient Vitals for the past 24 hrs:   BP Temp Temp src Pulse Resp SpO2   10/04/24 2345 (!) 162/89 97.2  F (36.2  C) Temporal 78 26 98 %   10/04/24 2215 (!) 140/70 -- -- 68 24 --   10/04/24 2212 -- 97.5  F (36.4  C) Axillary -- 24 --     Physical Exam  General: Well-nourished, restless in bed  Eyes: PERRL, conjunctivae pink no scleral icterus or conjunctival injection  ENT:  Moist mucus membranes  Respiratory:  Lungs clear to auscultation bilaterally, no respiratory distress  CV: Normal rate and rhythm, no murmurs/rubs/gallops  GI:  Abdomen soft and non-distended.  Normoactive BS.  No tenderness  Skin: Warm, dry.    Neuro: Alert but unable to determine if patient is oriented due to extreme hard of hearing.  Moving all 4 extremities.  No apparent cranial nerve deficit but difficulty in cooperating with exam.  Psychiatric: Agitated affect    Diagnostics     Lab Results   Labs Ordered and Resulted from Time of ED Arrival to Time of ED Departure - No data to display    Imaging   No orders to display       EKG   ECG results from 10/04/24   EKG 12 lead     Value    Systolic Blood Pressure     Diastolic Blood Pressure     Ventricular Rate 78    Atrial Rate 78    MA Interval 172    QRS Duration 92        QTc 446    P Axis 74    R AXIS -26    T Axis 20    Interpretation ECG      Sinus rhythm  Nonspecific ST abnormality  Abnormal ECG  When compared with ECG of 04-Oct-2024 12:18,  No significant change was  found         Independent Interpretation   None    ED Course      Medications Administered   Medications - No data to display      Discussion of Management   0118 I spoke with Dr. Pedroza, hospitalist, regarding the patient's presentation, findings, and plan of care. He does not accept the patient for admission. We will plan on having the patient stay in the ED rather than the hospital and receive social work consultation.    ED Course   ED Course as of 10/05/24 0129   Fri Oct 04, 2024   2342 I obtained history and examined the patient as noted above   Sat Oct 05, 2024   0028 I rechecked the patient and explained findings.   0123 I consulted with Dr. Pedroza of the hospitalist service.  He reports that the patient does not meet criteria for hospitalization.  He reports that the patient would be care home care.  Recommends that we hold the Haldol or use a lower dose if he becomes agitated as this may have contributed to the weakness that he was experiencing when he was discharged back to his care facility.  Patient will remain on board in the emergency department overnight awaiting reassessment and social work consultation in the morning.       Additional Documentation  None    Medical Decision Making / Diagnosis     CMS Diagnoses: None    MIPS       None    MDM   Suleiman Spain is a 89 year old male who was evaluated in the emergency department previously in the day and discharged to his care facility.  Upon arrival, nursing in the care facility sent him back to the emergency department for concern of generalized weakness and reported that they felt uncomfortable.  They reported that they would not sign to receive the patient and so EMS returned the patient to the emergency department.  Full diagnostic workup completed earlier in the day and so no additional imaging or testing was required.  Given the report of bradycardia, an EKG was obtained.  Heart rate here is not bradycardic.  Given history of urinary  retention, bladder scan was obtained.  This was under 500 mL in the intermittent straight cath protocol was ordered.  I did call and leave a message with the patient's contact family member.  It is the middle of the night and we have not received a return call as yet.  I did attempt to admit the patient for generalized weakness and acute encephalopathy as this is apparently different from his baseline status.  Hospitalist service felt that there was no indication for admission and recommended [facility] care.  Given that the patient's care facility will not accept him back, were unable to contact any family members and he cannot be admitted to the hospital, we will observe him in the emergency department overnight with the hopes that he will clear and his care facility will accept him or we can arrange for higher level of care for him.  A social work consult was ordered but this is after hours and will not be completed until tomorrow.    Disposition   The patient was signed out to my partner at the shift change.    Diagnosis     ICD-10-CM    1. Acute encephalopathy  G93.40                  Scribe Disclosure:  I, Edd Pal, am serving as a scribe at 11:47 PM on 10/4/2024 to document services personally performed by Ida Law MD based on my observations and the provider's statements to me.        Ida Law MD  10/05/24 6902

## 2024-10-05 NOTE — ED TRIAGE NOTES
BIBA from [facility]. Pt discharged this evening back to prior living facility and upon arrival, staff felt uncomfortable as they stated patient was weaker than baseline. EMS states pt received haldol prior transport, facility staff reiterated they were uncomfortable and would not sign to receive patient. EMS returned with patient to ED.     Triage Assessment (Adult)       Row Name 10/04/24 2222          Triage Assessment    Airway WDL WDL        Respiratory WDL    Respiratory WDL WDL        Skin Circulation/Temperature WDL    Skin Circulation/Temperature WDL WDL        Cardiac WDL    Cardiac WDL WDL     Cardiac Rhythm NSR        Peripheral/Neurovascular WDL    Peripheral Neurovascular WDL WDL        Cognitive/Neuro/Behavioral WDL    Cognitive/Neuro/Behavioral WDL X     Level of Consciousness confused     Arousal Level opens eyes spontaneously     Orientation disoriented x 4     Speech garbled;illogical     Mood/Behavior anxious;restless        Pupils (CN II)    Pupil Size Left 3 mm     Pupil Size Right 3 mm        Monalisa Coma Scale    Best Eye Response 4-->(E4) spontaneous     Best Motor Response 5-->(M5) localizes pain     Best Verbal Response 4-->(V4) confused     [place] Coma Scale Score 13        Motor Response    LUE Motor Response purposeful/movement localizing     RUE Motor Response purposeful/movement localizing     LLE Motor Response purposeful/movement localizing     RLE Motor Response purposeful/movement localizing

## 2024-10-05 NOTE — PHARMACY-ADMISSION MEDICATION HISTORY
"Pharmacy Intern Admission Medication History    Admission medication history is complete. The information provided in this note is only as accurate as the sources available at the time of the update.    Information Source(s): Facility ([facility]/) medication list/MAR via N/A    Pertinent Information: Medication list verified by MAR from [facility] ([phone]).    Changes made to PTA medication list:  Added:   Tums  Aspirin  Refresh Eye Drops  Multivitamin  Quetiapine  Rivastigime  Vitamin D  Deleted: None  Changed: Marked Tramadol \"not taking\"    Allergies reviewed with patient and updates made in EHR: yes    Medication History Completed By: Taya Triana 10/5/2024 7:56 AM    PTA Med List   Medication Sig Last Dose    acetaminophen (TYLENOL) 500 MG tablet Take 2 tablets (1,000 mg) by mouth every 6 hours as needed for pain  at PRN    amLODIPine (NORVASC) 5 MG tablet Take 5 mg by mouth daily 10/4/2024 at AM    aspirin 81 MG EC tablet Take 81 mg by mouth. ON WEDNESDAYS Unknown    bimatoprost (LUMIGAN) 0.01 % SOLN Place 1 drop Into the left eye At Bedtime 10/3/2024 at PM    calcium carbonate (TUMS) 500 MG chewable tablet Take 2 chew tab by mouth every 6 hours as needed for heartburn.  at PRN    carboxymethylcellulose PF (REFRESH PLUS) 0.5 % ophthalmic solution Place 1-2 drops into both eyes 4 times daily as needed for dry eyes.  at PRN    dorzolamide-timolol (COSOPT) 2-0.5 % ophthalmic solution Place 1 drop into both eyes 2 times daily 10/4/2024 at AM    Multiple Vitamins-Minerals (MULTIVITAMIN MEN 50+) TABS Take 1 tablet by mouth daily. 10/4/2024 at AM    netarsudil (RHOPRESSA) 0.02 % ophthalmic solution Place 1 drop into the right eye At Bedtime 10/3/2024 at PM    QUEtiapine (SEROQUEL) 25 MG tablet Take 25 mg by mouth every 6 hours as needed (AGITATION).  at PRN    rivastigmine (EXELON) 1.5 MG capsule Take 1.5 mg by mouth 2 times daily. 10/4/2024 at AM    rosuvastatin (CRESTOR) 10 MG tablet Take 10 mg by " mouth daily 10/4/2024 at AM    Vitamin D, Cholecalciferol, 25 MCG (1000 UT) CAPS Take 1 capsule by mouth daily. 10/4/2024 at AM

## 2024-10-05 NOTE — PROGRESS NOTES
Care Management Medical longterm Outpatient Consult    Care management consulted by provider for senior living assessment.  CM spoke with provider to discuss senior living case. Provider has confirmed patient is medically stable for discharge.    Background information: Patient comes from [facility]. Writer was able to speak with patient's son Sonali ([phone]) regarding discharge planning. Patient's son lives in [place] and patient's daughter is currently up at the [facility] for the weekend and is not reachable at this time. Writer called patient's daughter and left a voicemail. Patient has a spouse that lives with him in the assisted living but spouse is in Memory Care. Patient typically ambulates with a cane. Son could not give additional details about services provided at the [facility]. Patient's insurance plan would require a 3 day inpatient stay to get coverage for TCU and patient is not impatient status. Patient does not have a VBC program that would be able to waive this requirement.     Writer called [facility] to try and get additional background information. Writer was able to speak with a nursing aid who stated there were no nurses over the weekend that patient likely would not be able to be admitted back into [facility] without nursing staff there to do so. Writer asked for on-call nurse number. Writer was given [phone]. Writer called nurse Angela and patient typically gets safety checks and medication management. Patient would need to transition to a higher level of care as patient is on a sitter right now and nursing staff at the [facility] had concerns for patient having agitation and lack of mobility. Per nursing, they have safety concerns for patient with his memory as patient left the [facility] at 2:30 in the morning and was found by police who brought him back to his [facility] earlier this year. Writer asked MD to place a consult for PT to establish what level of mobility the patient has currently as  previously he is independent in his apartment. Writer spoke with PT who said they would see the patient as they are able to. Writer spoke with supervisor who confirmed there are not further steps to be taken and that registration will have to connect with family regarding financials to switch patient to long-term care.     Writer called patient's son back to try and discuss having the patient's family care for the patient while waiting till Monday for the possibility of increased services for the patient at their assisted living.     Care team recommended discharge disposition:  Discharge back to [facility] with increased services    Resources needed for discharge: Increased Services at [facility]    Discharge plan secured to meet patient's needs?  No    Estimated timeline for discharge:   greater than 24 hours    Barriers to discharge: Patient has needs greater than what their [facility] is able to provide with current services. Patient is currently needing a sitter in the room and patient at his [facility] does not receive 24/7 services. Patient would not be safe to return with his current services in place.     Discussed above with provider & charge RN.      Next steps:       Anticipate patient will transition to long-term care due to noted barriers to discharge.  CM left message with Son Sonali over the phone. Writer later was able to speak with Sonali, patient's son. Discussed patient no longer meets medical criteria for ongoing hospital level of care & medical insurance may not cover the ongoing hospital stay.  CM notified charge RN of planned transition to long-term care.  CM team will continue work towards discharge plan to meet patient's needs.      Garrett Stout MSW, TLGSW  [facility]  Care Management

## 2024-10-05 NOTE — ED NOTES
[facility]  ED Nurse Handoff Report    ED Chief complaint: Generalized Weakness      ED Diagnosis:   Final diagnoses:   Acute encephalopathy       Code Status: Per the admitting provider     Allergies:   Allergies   Allergen Reactions    Iodinated Contrast Media Hives     DYE  DYE      Aspirin Buffered Unknown and GI Disturbance    Brimonidine Other (See Comments)     stomach ache, head ache, sore muscles  Eye burning      Fd&C Blue #2 (Indigotine) Hives    Ibuprofen Unknown     Stomach ulcers and bleeding      Aspirin GI Disturbance and Itching     Gi bleed; not an allergy  Okay for low dose asa 81mg M W F  Gi bleed; not an allergy  Okay for low dose asa 81mg M W F      Erythromycin Rash    Penicillins Rash       Patient Story: Pt poor historian, confuse  Per EMS pt was  discharged yesterday from the ED to his memory care facility. When EMS arrived at the facility,  staff said  the patient was too  weaker in comparison to  baseline and were uncomfortable keeping him at the facility. Pt was returned to the ER ED for further evaluation Hx: Dementia, HTN, CVA, DM2.        Focused Assessment:  Pt alert to self, very [place]. No distress noted. Sitter 1:1 at the bedside as a precautionary measure, as pt is confused, restless and intermittently putting his leg over the bed rails.    Treatments and/or interventions provided: EKG, Bladder scanned for 359 ml. Pt straight cath for 550 ml of tea color urine.  Results for orders placed or performed during the hospital encounter of 10/04/24   EKG 12 lead     Status: None (Preliminary result)   Result Value Ref Range    Systolic Blood Pressure  mmHg    Diastolic Blood Pressure  mmHg    Ventricular Rate 78 BPM    Atrial Rate 78 BPM    TX Interval 172 ms    QRS Duration 92 ms     ms    QTc 446 ms    P Axis 74 degrees    R AXIS -26 degrees    T Axis 20 degrees    Interpretation ECG       Sinus rhythm  Nonspecific ST abnormality  Abnormal ECG  When compared with  ECG of 04-Oct-2024 12:18,  No significant change was found        Patient's response to treatments and/or interventions:     To be done/followed up on inpatient unit:  See orders     Does this patient have any cognitive concerns?: Baseline dementia, Forgetful, Disoriented to time, Disoriented to place, Disoriented to situation, and Disorientation to person    Activity level - Baseline/Home:  Total Care  Activity Level - Current:   Total Care    Patient's Preferred language: English   Needed?: No    Isolation: None  Infection: Not Applicable  Patient tested for COVID 19 prior to admission: YES  Bariatric?: No    Vital Signs:   Vitals:    10/04/24 2212 10/04/24 2215 10/04/24 2345   BP:  (!) 140/70 (!) 162/89   Pulse:  68 78   Resp: 24 24 26   Temp: 97.5  F (36.4  C)  97.2  F (36.2  C)   TempSrc: Axillary  Temporal   SpO2:   98%       Cardiac Rhythm:Cardiac Rhythm: Normal sinus rhythm    Was the PSS-3 completed:   Yes  What interventions are required if any?              OBS brochure/video discussed/provided to patient/family: Yes              Name of person given brochure if not patient: N/A               Relationship to patient: N/A     For the majority of the shift this patient's behavior was Green.   Behavioral interventions performed were N/A .    ED NURSE PHONE NUMBER: *[phone]

## 2024-10-05 NOTE — ED NOTES
Pt removed all monitors and lines. Awake, restless and continues to attempt to exit bed despite redirection attempts. Bed alarm and chair/pad alarm in place and remain indicated.

## 2024-10-05 NOTE — CONSULTS
Please see note from 10/5 regarding assisted Consult.     Garrett Stout MSW, TLGSW  [facility]

## 2024-10-05 NOTE — ED PROVIDER NOTES
Patient care of patient from Dr. Law.  At that time, pending social work consult for likely [facility] care placement.  Social work discussed patient's condition with the patient's son along with the patient's current living facility.  Requested physical therapy evaluation.  This is pending at the time of signout.  Once this is completed, likely [facility] care admission.  I will sign the patient out to Dr. Nugent.     Jose Alfredo Vann MD  10/05/24 3017

## 2024-10-05 NOTE — ED NOTES
Sonali son updated that pt is coming back to facility. RN tried calling [facility] with no answer. Voicemail left and informed pt is coming back to them

## 2024-10-05 NOTE — ED NOTES
Pt confused and agitated while in bed, unable to redirect. Placed in brief as he frequently removes clothing, blankets as he attempts to exit. Placed in hospital bed equipped with bed alarm by EDT.

## 2024-10-06 NOTE — ED PROVIDER NOTES
I assumed care for this patient after signout from my colleague.  Plan at time of signout was to admit the patient for skilled nursing care.  Per chart review, social work was requesting a physical therapy evaluation.  Unfortunately they are short staffed and it is the weekends and this will not occur until Monday.  Either way, a physical therapy evaluation will not change the ultimate outcome of the skilled nursing care admission, so I spoke with the hospitalist who agreed with skilled nursing care admission.    Saturnino Nugent MD on 10/5/2024 at 10:03 PM       Saturnino Nugent MD  10/05/24 6756

## 2024-10-06 NOTE — PROGRESS NOTES
Observation goals  PRIOR TO DISCHARGE        Comments: -diagnostic tests and consults completed and resulted=Not Met, SW/PT to see  -vital signs normal or at patient baseline=Met  -tolerating oral intake to maintain hydration=Not Met, refusing to eat/drink  -returns to baseline functional status=Not Met, total care currently  -safe disposition plan has been identified=Met, accepted to [facility] memory care per son  Nurse to notify provider when observation goals have been met and patient is ready for discharge.

## 2024-10-06 NOTE — PROGRESS NOTES
[facility]    Medicine Progress Note - Hospitalist Service        Date of Admission:  10/5/2024 10:20 PM    Assessment & Plan:   Suleiman Spain is a markedly pleasant 89 year old gentleman with past medical history that is most notable for advanced dementia, who presents with acute generalized weakness and acute metabolic encephalopathy.     Generalized weakness   Acute delirium  Advanced dementia  -Patient presents with several days of generalized weakness with worsening agitation and delirium.  He was in the emergency room from 10//2024 and initially started care process was initiated, however he was admitted under observation for worsening weakness and delirium.  -On admission he was also found to be dehydrated   -I am concerned that his dementia may be progressing to its end stages.   -Initial workup was negative including normal electrolytes and normal urine analysis  -Please see goals of care discussion below  -Social work consulted for placement    Dysphagia  -Nursing staff have noticed concerns with cough with oral intake  -Patient was evaluated by SLP and they recommend higher level of care given severe cognitive deficit and ongoing dysphagia management.  They recommend full liquid diet with direct staff supervision for feeding assisted.  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 .     Prior CVA  Hypertension  Hyperlipidemia  -Resume prior to admission aspirin, statin  -Resume prior to admission amlodipine    Goals of care:  -I discussed with son Sonali on the phone this morning regarding goals of care.  I also shared my concern that patient might be progressing towards the end with advanced and progressive dementia.  He reported that patient has been accepted at memory care unit at the [facility].  Given his concurrent dysphagia I advised that it would be reasonable to look  into hospice which he was open to but wanted to first give a trial at [facility] to see how his dad would do.  If he did not do well then Sonali is open to getting hospice involved at that point.      Diet: Full Liquid Diet Thin Liquids (level 0) (Direct staff  supervision, small sips, upright position)     DVT Prophylaxis: Pneumatic Compression Devices   Linder Catheter: Not present  Code Status: No CPR- Do NOT Intubate     Disposition Plan       Expected Discharge Date: 10/06/2024        Discharge Comments: NANCY/RAI  speech consult      Entered: Markie Nunez MD 10/06/2024, 9:58 AM        Medically Ready for Discharge: Anticipated Tomorrow       Clinically Significant Risk Factors Present on Admission          # Hypocalcemia: Lowest Ca = 8.1 mg/dL in last 2 days, will monitor and replace as appropriate       # Drug Induced Platelet Defect: home medication list includes an antiplatelet medication                    The patient's care was discussed with the Bedside Nurse and Patient's Family.    Medical Decision Making       **CLEAR ALL SELECTIONS**      Labs/Imaging Reviewed:  See Information above and Data section below  Time SPENT BY ME on the date of service doing chart review, history, exam, documentation & further activities per the note:  35 MINUTES    Chart documentation was completed, in part, with [employer] voice-recognition software. Even though reviewed, some grammatical, spelling, and word errors may remain.    Markie Nunez MD  Hospitalist Service  [facility]  Text Page 7AM-6PM  Securely message with the Vocera Web Console (learn more here)  Text page via [employer] Paging/Directory    ______________________________________________________________________    Interval History   Patient has been intermittently agitated and requiring a bedside attendant.  Had a long discussion with son on the phone, he tells me that patient has been accepted at memory care unit although this has  "not been confirmed by social work.      Data reviewed today: I reviewed all medications, new labs and imaging results over the last 24 hours. I personally reviewed no images or EKG's today.    Physical Exam   Vital signs:  Temp: 99.3  F (37.4  C) Temp src: Oral BP: (!) 160/127 Pulse: 71   Resp: 18 SpO2: 98 % O2 Device: None (Room air)        Estimated body mass index is 25.58 kg/m  as calculated from the following:    Height as of 11/3/22: 1.702 m (5' 7\").    Weight as of 11/4/22: 74.1 kg (163 lb 4.8 oz).      Wt Readings from Last 2 Encounters:   11/04/22 74.1 kg (163 lb 4.8 oz)   07/07/21 77.6 kg (171 lb)       Gen: Awake, agitated but somewhat directable, confused and disoriented.  Resp: CTA B/L, normal WOB  CVS: RRR, no murmur  Abd/GI: Soft, non-tender. BS- normoactive.    Skin: Warm, dry no rashes  MSK: no pedal edema  Neuro- CN- intact. No focal deficits.        Data   Recent Labs   Lab 10/04/24  1247   WBC 8.0   HGB 14.8   MCV 93         POTASSIUM 4.0   CHLORIDE 108*   CO2 24   BUN 15.4   CR 0.79   ANIONGAP 9   SMILEY 8.1*   GLC 93   ALBUMIN 3.3*   PROTTOTAL 5.6*   BILITOTAL 0.4   ALKPHOS 88   ALT 11   AST 18       No results found for this or any previous visit (from the past 24 hour(s)).  Medications   Current Facility-Administered Medications   Medication Dose Route Frequency Provider Last Rate Last Admin    sodium chloride 0.9 % infusion   Intravenous Continuous Hector Patel  mL/hr at 10/06/24 0201 New Bag at 10/06/24 0201     Current Facility-Administered Medications   Medication Dose Route Frequency Provider Last Rate Last Admin    bimatoprost (LUMIGAN) 0.01 % ophthalmic drops 1 drop  1 drop Left Eye At Bedtime Hector Patel MD        dorzolamide-timolol (COSOPT) ophthalmic solution 1 drop  1 drop Both Eyes BID Hector Patel MD   1 drop at 10/06/24 0813    netarsudil (RHOPRESSA) 0.02 % ophthalmic solution 1 drop  1 drop Right Eye At Bedtime Hector Patel" MD Mateo

## 2024-10-06 NOTE — PROGRESS NOTES
Admission/Transfer from: ED  2 RN skin assessment completed. Yes  Name of 2nd RN: Young Silva RN  Significant findings include: dry, flaky skin, scattered bruising  WOC Nurse Consult Ordered? No

## 2024-10-06 NOTE — PLAN OF CARE
Goal Outcome Evaluation:      Goal Outcome Evaluation:        PRIMARY Concern: AMS, weakness  SAFETY RISK Concerns (fall risk, behaviors, etc.): fall risk      Aggression Tool Color: yellow. Tries to grab arms and hit with arms and legs when he gets little restless.   Isolation/Type: none  Tests/Procedures for NEXT shift: AM labs,   Consults? (Pending/following, signed-off?) SLP, CM/SW  Where is patient from? (Home, TCU, etc.): [facility]  Other Important info for NEXT shift: hospitalist talked to son to discuss about hospice care and son reported pt has been accepted at [facility]. Son reported he will consider hospice after giving a trial at [facility]. Unable to swallow pills, risk for aspiration.   Anticipated DC date & active delays: TBD  _____________________________________________________________________________  SUMMARY NOTE:  Orientation/Cognitive: Disoriented x4, speech is garbled, hard to understand.   Observation Goals (Met/ Not Met): not met  Mobility Level/Assist Equipment: A2 w/ lift.   Antibiotics & Plan (IV/po, length of tx left): none  Pain Management: none  Tele/VS/O2: VSS on RA ex HTN  ABNL Lab/BG: none this shift  Diet: thin liquid. High risk for aspiration. Unable to swallow.   Bowel/Bladder: bladder scan 376. Will recheck in 2 hours.   Skin Concerns: scattered bruising, dry/flaky  Drains/Devices: PIV SL   Patient Stated Goal for Today: unable to give goal     Observation goals  PRIOR TO DISCHARGE       Comments: -diagnostic tests and consults completed and resulted: not met. SW and Speech consult pending  -vital signs normal or at patient baseline: partially met, HTN  -tolerating oral intake to maintain hydration: not met  -returns to baseline functional status: not met  -safe disposition plan has been identified: not met  Nurse to notify provider when observation goals have been met and patient is ready for discharge.

## 2024-10-06 NOTE — PROGRESS NOTES
Observation goals  PRIOR TO DISCHARGE       Comments: -diagnostic tests and consults completed and resulted: not met. SW and speech consult pending   -vital signs normal or at patient baseline: partially met, HTN  -tolerating oral intake to maintain hydration: not met  -returns to baseline functional status: not met  -safe disposition plan has been identified: not met  Nurse to notify provider when observation goals have been met and patient is ready for discharge.

## 2024-10-06 NOTE — H&P
[facility]    History and Physical  Hospitalist       Date of Admission:  10/5/2024  Date of Service (when I saw the patient): 10/05/24    ASSESSMENT  Suleiman Spain is a markedly pleasant 89 year old gentleman with past medical history that is most notable for advanced dementia, who presents with acute generalized weakness and acute metabolic encephalopathy.    PLAN     Acute generalized weakness and acute metabolic encephalopathy: Of note, Mr. Spain has dementia. Review of medical record suggest progression of this within the past year. He resides at an [facility]. His family reportedly are trying to have him moved to a memory care facility.    Now, he presents for several days of acute severe generalized weakness and ongoing encephalopathy with agitation and delirium. He has been in the ED since 10/4/2024. Initially he had sinus bradycardia which has resolved; EKG shows no acute changes and Troponins are negative. UA and CTH as well as CBC and CMP are unremarkable. The [facility] care process was initially started earlier today. However I am called tonight to assess further acute worsening of his physical and mental condition.    Mr. Spain has seemed to develop acute, generalized weakness due to severe dehydration. He does not appear to be eating or drinking voluntarily at this point. He is markedly emaciated on exam and is now unable to sit up in the bed unassisted due to weakness. He also has ongoing intermittent agitations with evident hallucinations. Overall, I am concerned that his dementia may be progressing to its end stages. Continuous intravenous fluid resuscitation has been started today in the ED, but his urine output remains low. We will plan to admit to Observation for further continuous IV fluid overnight. I am unable at present to reach family by phone for further discussions.       -- Observation. Fall precautions. Bedside attendant. IV  ml/hour ordered for 10 hours with a  stop date in AM for clinical reassessment.    -- Further considerations for evaluation could include further IV fluid, perhaps MRI Brain, perhaps Neurology consultation for possible signs of Parkinsonism, pending clinical course    -- He might also meet criteria for hospice, pending care goal discussions with familty    -- High risk for aspiration; precautions ordered. SLP consulted.    -- Clinical reassessment in AM; if his condition has improved with IVF, perhaps he could safely be discharged back to his [facility] at that point. Otherwise, nursing home care for disposition planning may have to be re-instituted    -- Tylenol, prn Seroquel continued    -- SW consulted for disposition planning.    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 Noted alos to be very hard of hearing.    Prior stroke, hypertension, hyperlipidemia: Resume home medications when verified    I have spent 55 minutes on the date of service doing chart review, history, examination, documentation, and further activities per the note.    Chief Complaint   Weakness    Unable to obtain a history from the patient due to confusion; history obtained from the ED physician whom I have spoken with    History of Present Illness   Suleiman Spain is a markedly pleasant 89 year old gentleman who presents with acute generalized weakness. He was originally brought to the ED on 10/4/24 after staff at his [facility] found him severely weak, much more so than he normally is. He had bradycardia as well. EMS provided Atropine twice en route. In the ED it was noted that he has advanced dementia. EKG showed sinus bradycardia without ST segment changes. CBC and CMP were notable for Cl 108, Ca 8.1, Alb 3.3, Tprot 5.6, otherwise were within the normal reference range. WBC was 8.0. While in the ED he was nloted to have episodic urinary retention. He was straight catheterized. UA was negative. CT Head showed no acute pathology. He received haldol for intermittent agitation. His case  was discussed with his family who reportedly live in [place] and have bene trying to get him admitted to a memory care facility. He was discharged back to his [facility].    He returned to the ED later that night after staff noted that ongoing severe acute weakness, unable to sit or stand up unassisted, when up to recently he had been fully ambulatory, conversant and interactive. He has continued to be intermittently agitated and aggressive at times. He was observed in the ED overnight until long term care Social Work services could see him today. At their request, a PT consult was ordered from the ED, which is pending for Monday. He has remained in the ED today. He has had essentially no PO intake of food or liquid today, despite it being offered. He has developed even further worsening weakness. At this point he can not sit up in the bed without tipping over to the left side. I am called to assess him tonight. On my bedside assessment, he has just received a dose of Seroquel. He is somnolent and very difficult to arouse. When aroused, he does not attempt to speak. His mucus membranes are dry. He appears emaciated and frail. He has severe resting tremor with evident muscle rigidity. At times, ED nursing state that he will become agitated and try to  them. Then he will fall back asleep again. IV NS was started at 3 PM and has continued through the afternoon into tonight. Current urine output is low and dark colored. Further history can not as yet be obtained.    Serial Troponins were checked and negative and EKG repeated and now shows ongoing NSR with normal rate, without interval or ST segment changes.    In the ED,   10/05 2225 183/82 98.6  F (37  C) 62 18 95 %     PHYSICAL EXAM  Blood pressure (!) 183/82, pulse 62, temperature 98.6  F (37  C), resp. rate 18, SpO2 95%.  Constitutional: Somnolent but arousable; no apparent distress.  Respiratory: lungs clear to auscultation bilaterally  Cardiovascular: regular S1  S2  GI: abdomen soft non tender non distended bowel sounds positive  Musculoskeletal: no clubbing, cyanosis or edema  Neurologic: extra-ocular muscles intact; moves all four extremities when awake; resting tremor, left worse then right, in UE's. Some cogwheeling noted.     DVT Prophylaxis: Pneumatic Compression Devices  Code Status: DNR / DNI as per the listed standing order on his nursing facility sheet; will need to be confirmed with family in AM    Medically Ready for Discharge: Anticipated Tomorrow       Hector Patel MD, MD    Past Medical History    I have reviewed this patient's medical history and updated it with pertinent information if needed.   Past Medical History:   Diagnosis Date    Benign essential hypertension     Cervicalgia 09/24/2009    Diabetes mellitus (H)     GERD (gastroesophageal reflux disease)     Hyperlipidemia     Peripheral neuropathy     Presbycusis     Stroke (H)        Past Surgical History   I have reviewed this patient's surgical history and updated it with pertinent information if needed.  Past Surgical History:   Procedure Laterality Date    CATARACT EXTRACTION      LASER SURGERY OF EYE Left     SLT    SCLERAL FISTULIZATION Left     TONSILLECTOMY         Prior to Admission Medications   Prior to Admission Medications   Prescriptions Last Dose Informant Patient Reported? Taking?   Multiple Vitamins-Minerals (MULTIVITAMIN MEN 50+) TABS   Yes No   Sig: Take 1 tablet by mouth daily.   QUEtiapine (SEROQUEL) 25 MG tablet   Yes No   Sig: Take 25 mg by mouth every 6 hours as needed (AGITATION).   Vitamin D, Cholecalciferol, 25 MCG (1000 UT) CAPS   Yes No   Sig: Take 1 capsule by mouth daily.   acetaminophen (TYLENOL) 500 MG tablet   No No   Sig: Take 2 tablets (1,000 mg) by mouth every 6 hours as needed for pain   amLODIPine (NORVASC) 5 MG tablet   Yes No   Sig: Take 5 mg by mouth daily   aspirin 81 MG EC tablet   Yes No   Sig: Take 81 mg by mouth. ON WEDNESDAYS   bimatoprost  (LUMIGAN) 0.01 % SOLN   Yes No   Sig: Place 1 drop Into the left eye At Bedtime   calcium carbonate (TUMS) 500 MG chewable tablet   Yes No   Sig: Take 2 chew tab by mouth every 6 hours as needed for heartburn.   carboxymethylcellulose PF (REFRESH PLUS) 0.5 % ophthalmic solution   Yes No   Sig: Place 1-2 drops into both eyes 4 times daily as needed for dry eyes.   dorzolamide-timolol (COSOPT) 2-0.5 % ophthalmic solution   Yes No   Sig: Place 1 drop into both eyes 2 times daily   netarsudil (RHOPRESSA) 0.02 % ophthalmic solution   Yes No   Sig: Place 1 drop into the right eye At Bedtime   rivastigmine (EXELON) 1.5 MG capsule   Yes No   Sig: Take 1.5 mg by mouth 2 times daily.   rosuvastatin (CRESTOR) 10 MG tablet   Yes No   Sig: Take 10 mg by mouth daily   traMADol (ULTRAM) 50 MG tablet   No No   Sig: Take 1 tablet (50 mg) by mouth every 6 hours as needed for severe pain   Patient not taking: Reported on 10/5/2024      Facility-Administered Medications: None     Allergies   Allergies   Allergen Reactions    Iodinated Contrast Media Hives     DYE  DYE      Aspirin Buffered Unknown and GI Disturbance    Brimonidine Other (See Comments)     stomach ache, head ache, sore muscles  Eye burning      Fd&C Blue #2 (Indigotine) Hives    Ibuprofen Unknown     Stomach ulcers and bleeding      Aspirin GI Disturbance and Itching     Gi bleed; not an allergy  Okay for low dose asa 81mg M W F  Gi bleed; not an allergy  Okay for low dose asa 81mg M W F      Erythromycin Rash    Penicillins Rash       Social History   I have reviewed this patient's social history and updated it with pertinent information if needed. Suleiman Spain  reports that he has quit smoking. His smoking use included cigarettes. He has never used smokeless tobacco. He reports current alcohol use.    Family History   Family history assessed and, except as above, is non-contributory.    No family history on file.    Review of Systems   The 10 point Review of  Systems is negative other than noted in the HPI or here.     Primary Care Physician   Irvin Oliva    Data   Labs Ordered and Resulted from Time of ED Arrival to Time of ED Departure - No data to display    Data reviewed today:  I personally reviewed the EKG tracing showing NSR .

## 2024-10-06 NOTE — ED PROVIDER NOTES
I consulted with Dr. Patel.  He was asked to consult on the patient as the plan was for [facility] care.  After consultation, he felt that the patient requires a medical admission as he is unwell appearing and he recommended and requested that I put a bed request in for observation admission as he suspects the patient is requiring hospice and additional medical assistance.     Ida Law MD  10/05/24 2173

## 2024-10-06 NOTE — PROGRESS NOTES
Observation goals  PRIOR TO DISCHARGE       Comments: -diagnostic tests and consults completed and resulted: not met  -vital signs normal or at patient baseline: partially met, HTN  -tolerating oral intake to maintain hydration: not met  -returns to baseline functional status: not met  -safe disposition plan has been identified: not met  Nurse to notify provider when observation goals have been met and patient is ready for discharge.

## 2024-10-06 NOTE — PROGRESS NOTES
Physician Attestation   I agree with the information in this note.    Hector Patel MD     Speech Language Pathology- Clinical swallow evaluation                                                                               [facility]      OUTPATIENT SPEECH LANGUAGE PATHOLOGY EVALUATION  PLAN OF TREATMENT FOR OUTPATIENT REHABILITATION  (COMPLETE FOR INITIAL CLAIMS ONLY)  Patient's Last Name, First Name, M.I.  YOB: 1931  JuditSuleiman  D                        Provider's Name  [facility] Medical Record No.  [id number]                               Onset Date:  10/05/24  Start of Care Date:      Type:     ___PT   ___OT   _X_SLP Medical Diagnosis:            SLP Diagnosis:     Visits from SOC:  1   ________________________________________________________________  Plan of Treatment/Functional Goals    Planned Interventions:   Dysphagia Treatment       Instruction of safe swallow strategies, Compensatory strategies for swallowing        Goals: See Speech Language Pathology Goals on Care Plan in [facility] electronic health record.    Therapy Frequency:5 times/week   Predicted Duration of Therapy Intervention: 10/20/24  ________________________________________________________________________________    I CERTIFY THE NEED FOR THESE SERVICES FURNISHED UNDER        THIS PLAN OF TREATMENT AND WHILE UNDER MY CARE     (Physician attestation of this document indicates review and certification of the therapy plan).                     Referring Physician: Hector Patel MD           Initial Assessment        See Speech Language Pathology documentation in Epic electronic health record, evaluation dated          10/06/24 0853   Appointment Info   Signing Clinician's Name / Credentials (SLP) Ele Mason MS CCC SLP   General Information   Onset of Illness/Injury or Date of Surgery 10/05/24   Referring Physician Hector Patel MD  "  Patient/Family Therapy Goal Statement (SLP) Pt does not state any goals.   Pertinent History of Current Problem Per chart \"Suleiman Spain is a markedly pleasant 89 year old gentleman with past medical history that is most notable for advanced dementia, who presents with acute generalized weakness and acute metabolic encephalopathy.\" SLP consulted for swallow evaluation.  Pt is [facility] resident, family has been trying to move him to memory care facility.   General Observations Pt is seen sitting up in bed. He is quite confused. Speech is very dysarthric and difficult to understand, but he seems to be perseverating on some missing files and bills that need to be paid.   Type of Evaluation   Type of Evaluation Swallow Evaluation   Oral Motor   Mucosal Quality dry   Dentition (Oral Motor)   Dentition (Oral Motor) significant number of missing teeth   Facial Symmetry (Oral Motor)   Facial Symmetry (Oral Motor) unable/difficult to assess   Lip Function (Oral Motor)   Lip Range of Motion (Oral Motor) unable/difficult to assess   Comment, Lip Function (Oral Motor) Patient does not follow commands for oral mech exam.   Tongue Function (Oral Motor)   Tongue ROM (Oral Motor) unable/difficult to assess   Comment, Tongue Function (Oral Motor) Patient does not follow commands for oral mech exam.   Facial Sensation   Facial Sensation other (see comments)  (Unable to assess- patient does not follow commands for oral mech exam.)   Cough/Swallow/Gag Reflex (Oral Motor)   Comment, Cough/Swallow/Gag Reflex (Oral Motor) Patient does not follow commands for oral mech exam.   Vocal Quality/Secretion Management (Oral Motor)   Vocal Quality (Oral Motor) hoarse;harsh;dysphonic;breathy   Secretion Management (Oral Motor) WNL   Comment, Vocal Quality/Secretion Management (Oral Motor) No overt difficulty managing oral secretions. Reflexive swallow response noted several times during evaluation.   General Swallowing Observations   Past History of " "Dysphagia VFSS 4/24/12 \"PT presents with minimal dysphagia characterized by flash penetration with thin liquids only during consecutive swallows.  PT's globus sensation is likely related to GERD and possible esophagitis.  See esophagram report from [facility] outpatient services on 4/23/12 for further details.  PT's swallowing safety is WFL, excellent pharyngeal constriction and no upper esophageal stenosis or stricture noted during today's video swallow study. \"   Respiratory Support room air   Current Diet/Method of Nutritional Intake (General Swallowing Observations, NIS) regular diet   Swallowing Evaluation Clinical swallow evaluation   Clinical Swallow Evaluation   Feeding Assistance dependent   Clinical Swallow Evaluation Textures Trialed thin liquids   Clinical Swallow Eval: Thin Liquid Texture Trial   Mode of Presentation, Thin Liquids spoon;fed by clinician   Volume of Liquid or Food Presented 1 sip via spoon   Pharyngeal Phase of Swallow   (no overt s/s aspiration)   Diagnostic Statement Pt would not open oral cavity for oral cares, presses lips tightly together.  When offered water or ice chips via spoon, he closes mouth. He did accept a single trial of water, which resulted in no overt s/s aspiration. A swallow response was palpated. He would not accept any additional po trials.   Esophageal Phase of Swallow   Patient reports or presents with symptoms of esophageal dysphagia No   Swallowing Recommendations   Diet Consistency Recommendations full liquid diet;thin liquids (level 0)   Supervision Level for Intake 1:1 supervision needed   Mode of Delivery Recommendations bolus size, small;no straws;slow rate of intake   Monitoring/Assistance Required (Eating/Swallowing) monitor for cough or change in vocal quality with intake   Recommended Feeding/Eating Techniques (Swallow Eval) maintain upright sitting position for eating   Medication Administration Recommendations, Swallowing (SLP) via non oral " route if possible.  Could try crushed in puree carrier.   Instrumental Assessment Recommendations instrumental evaluation not recommended at this time  (consider as appropriate)   General Therapy Interventions   Planned Therapy Interventions Dysphagia Treatment   Dysphagia treatment Instruction of safe swallow strategies;Compensatory strategies for swallowing   Clinical Impression   Criteria for Skilled Therapeutic Interventions Met (SLP Eval) Yes, treatment indicated   Risks & Benefits of therapy have been explained evaluation/treatment results reviewed;patient;participants included  (Pt does not appear able to understand care plan or verbalize understanding.)   Clinical Impression Comments Clinical swallow evaluation completed. Pt does not follow any commands for oral [facility] exam. Speech is quite dysarthric and difficult to understand.  Pt appears confused/mildly agitated and is perseverating on topics unrelated to current situation. Note scattered dentition. Oral cares were attempted,  though pt closes lips tightly or pushes swab out of mouth.  Pt did accept a single trial of water via spoon with no overt s/s aspiration noted. Patient did not accept any additional po trials. Recommend Full liquid diet with direct staff supervision for feeding assist.  Small sips only via cup or spoon. Upright position. Discontinue feeding if s/s aspiration or respiratory decline occurs. SLP will follow.   SLP Total Evaluation Time   Eval: oral/pharyngeal swallow function, clinical swallow Minutes (73514) 25   SLP Goals   Therapy Frequency (SLP Eval) 5 times/week   SLP Predicted Duration/Target Date for Goal Attainment 10/20/24   SLP Goals Swallow   SLP: Safely tolerate diet without signs/symptoms of aspiration Pureed diet;Thin liquids;With use of swallow precautions;With use of compensatory swallow strategies;With assistance/supervision   Interventions   Interventions Quick Adds Swallowing Dysfunction   SLP Discharge Planning   SLP  Plan po trials, ADAT   SLP Discharge Recommendation inpatient hospice  (memory care facilty, long term care)   SLP Rationale for DC Rec Anticipate patient will require higher level of care given severity of cognitive deficits. Pt may need ongoing dysphagia management pending clinical progress.   SLP Brief overview of current status  Recommend Full liquid diet with direct staff supervision for feeding assist. Small sips only via cup or spoon. Upright position. Discontinue feeding if s/s aspiration or respiratory decline occurs. SLP will follow.   Total Session Time   Total Session Time (sum of timed and untimed services) 25

## 2024-10-06 NOTE — PROGRESS NOTES
RECEIVING UNIT ED HANDOFF REVIEW    ED Nurse Handoff Report was reviewed by: Jennifer Lindsey RN on October 6, 2024 at 2:23 AM

## 2024-10-06 NOTE — ED NOTES
[facility]  ED Nurse Handoff Report    ED Chief complaint: Social Work Services      ED Diagnosis:   Final diagnoses:   Generalized weakness       Code Status: to be addressed by hospitalist    Allergies:   Allergies   Allergen Reactions    Iodinated Contrast Media Hives     DYE  DYE      Aspirin Buffered Unknown and GI Disturbance    Brimonidine Other (See Comments)     stomach ache, head ache, sore muscles  Eye burning      Fd&C Blue #2 (Indigotine) Hives    Ibuprofen Unknown     Stomach ulcers and bleeding      Aspirin GI Disturbance and Itching     Gi bleed; not an allergy  Okay for low dose asa 81mg M W F  Gi bleed; not an allergy  Okay for low dose asa 81mg M W F      Erythromycin Rash    Penicillins Rash       Patient Story: Pt was seen and worked up in the ED yesterday, was switched to correction care. Pt has advanced dementia, was at a memory care facility and indep at baseline. Pt has stopped eating and drinking and had a rapid decline in general health. Admitting now for comfort care.   Focused Assessment:  Pt alert, oriented to self only    Treatments and/or interventions provided: IVF  Patient's response to treatments and/or interventions: pending    To be done/followed up on inpatient unit:  possible hospice    Does this patient have any cognitive concerns?: Alzheimer's    Activity level - Baseline/Home:  independent  Activity Level - Current:   Total Care    Patient's Preferred language: English   Needed?: No    Isolation: None  Infection: Not Applicable  Patient tested for COVID 19 prior to admission: NO  Bariatric?: No    Vital Signs:   Vitals:    10/05/24 2225   BP: (!) 183/82   Pulse: 62   Resp: 18   Temp: 98.6  F (37  C)   SpO2: 95%       Cardiac Rhythm:     Was the PSS-3 completed:   Yes  What interventions are required if any?               Family Comments: available by phone  OBS brochure/video discussed/provided to patient/family: No              Name of person  given brochure if not patient: NA              Relationship to patient: NA    For the majority of the shift this patient's behavior was Green.   Behavioral interventions performed were NA.    ED NURSE PHONE NUMBER: [phone]

## 2024-10-06 NOTE — ED TRIAGE NOTES
Patient readmitted as snf care, looking for more services at living facility. Having increased confusion, weakness and memory concerns.        
no

## 2024-10-06 NOTE — PLAN OF CARE
Goal Outcome Evaluation:      PRIMARY Concern: AMS, weakness  SAFETY RISK Concerns (fall risk, behaviors, etc.): fall risk      Aggression Tool Color: yellow, received seroquel and haldol in ED before being brought to floor  Isolation/Type: none  Tests/Procedures for NEXT shift: AM labs,   Consults? (Pending/following, signed-off?) SLP, CM/SW  Where is patient from? (Home, TCU, etc.): [facility]  Other Important info for NEXT shift:   Anticipated DC date & active delays: TBD  _____________________________________________________________________________  SUMMARY NOTE:  Orientation/Cognitive: Disoriented x4, speech is garbled, had visual hallucinations this shift. PRN seroquel x1.  Observation Goals (Met/ Not Met): not met  Mobility Level/Assist Equipment: lift  Antibiotics & Plan (IV/po, length of tx left): none  Pain Management: none  Tele/VS/O2: VSS on RA ex HTN  ABNL Lab/BG: none this shift  Diet: Regular  Bowel/Bladder: straight cathed in ED, Last bladder scan 184  Skin Concerns: scattered bruising, dry/flaky  Drains/Devices: PIV infusing NS at 100mL/hr  Patient Stated Goal for Today: unable to give goal    Observation goals  PRIOR TO DISCHARGE       Comments: -diagnostic tests and consults completed and resulted: not met  -vital signs normal or at patient baseline: partially met, HTN  -tolerating oral intake to maintain hydration: not met  -returns to baseline functional status: not met  -safe disposition plan has been identified: not met  Nurse to notify provider when observation goals have been met and patient is ready for discharge.

## 2024-10-07 NOTE — PROGRESS NOTES
Care Management Follow Up    Length of Stay (days): 0    Expected Discharge Date: 10/08/2024     Concerns to be Addressed: discharge planning     Patient plan of care discussed at interdisciplinary rounds: Yes    Anticipated Discharge Disposition: Assisted Living              Anticipated Discharge Services:    Anticipated Discharge DME: Walker    Patient/family educated on Medicare website which has current facility and service quality ratings: other (see comments)  Education Provided on the Discharge Plan: Yes  Patient/Family in Agreement with the Plan: yes    Referrals Placed by CM/SW:    Private pay costs discussed: Not applicable    Discussed  Partnership in Safe Discharge Planning  document with patient/family: No     Handoff Completed: No, handoff not indicated or clinically appropriate    Additional Information:  Writer checked in with staff at [facility] to see how many doses patient received of rivastigmine. He received Wed pm, Thursday Am/PM and then Friday AM, so 4 doses total. Patient did get 2 doses here on Saturday.     Next Steps: follow for discharge planning.    Naty Small RN

## 2024-10-07 NOTE — CONSULTS
[facility]    Neurology Consultation    Suleiman Spain MRN# [id number]   YOB: 1931 Age: 89 year old    Code Status:No CPR- Do NOT Intubate   Date of Admission: 10/5/2024  Date of Consult:10/07/2024                                                                                       Assessment and Plan:                                         #.  Dementia  -- I suspect patient has more of a vascular dementia given his history of diabetes with moderate to severe chronic microvascular ischemic changes as seen on MRI in 2023 and CT this admission.  UA negative. He was started on rivastigmine last month, given the initiation and his current decline I do feel would be best to maybe hold his rivastigmine as this could be contributing to his symptoms.  Interview the charts it appears he did have an episode of confusion in July and so I do feel there that this has been ongoing longer than family or establishment has been aware.   -Will hold rivastigmine  -UA negative, recommend evaluation of chest given his current dysphagia and productive cough    Unable to get a hold of daughter today, will try again tomorrow.     ----------------------------------------------------------------------------------  ----------------------------------------------------------------------------------  Reason for consult: as above       Chief Complaint:   Dementia           History of Present Illness:   This patient is a 89 year old male who presents with confusion     Per review of the chart, patient was recently diagnosed with dementia at his family medicine clinic in September, he did have cognitive testing at [facility] where he scored a 8/30 on his MoCA.  TSH and B12 was ordered and within normal limits.  Plan was to do an MRI of the brain in the outpatient setting.  Patient had a appointment with neurology set up in December.  He presented for generalized weakness.  UA was negative for any infection.  CT  head obtained and showed significant generalized atrophy as well as personal view cerebral white matter changes consistent with chronic small vessel ischemic disease.  He does have type 2 diabetes.  In review of charts it does appear he had an episode of confusion where he went to a different apartment room in July and slept on the floor.  He also has history of hearing aids and worsening vision from his glaucoma.  He is not having more dysarthria and dysphagia.  It does not appear that he was in the memory care unit upon reviewing notes but instead his wife is in the memory care unit and he lives with her for that reason.  Patient was unable to answer any questions regarding his hospitalization at this time.  Unable to reach daughter who is more familiar with his baseline.    MEDICAL DOCUMENTATION REVIEWED: July urgent care note on 7/3, H&P           Past Medical History:     Past Medical History:   Diagnosis Date    Benign essential hypertension     Cervicalgia 09/24/2009    Diabetes mellitus (H)     GERD (gastroesophageal reflux disease)     Hyperlipidemia     Peripheral neuropathy     Presbycusis     Stroke (H)          Past Surgical History:     Past Surgical History:   Procedure Laterality Date    CATARACT EXTRACTION      LASER SURGERY OF EYE Left     SLT    SCLERAL FISTULIZATION Left     TONSILLECTOMY            Social History:     Social History     Socioeconomic History    Marital status:    Tobacco Use    Smoking status: Former     Types: Cigarettes    Smokeless tobacco: Never   Substance and Sexual Activity    Alcohol use: Yes     Social Determinants of Health     Financial Resource Strain: Unknown (10/6/2024)    Financial Resource Strain     Within the past 12 months, have you or your family members you live with been unable to get utilities (heat, electricity) when it was really needed?: Patient unable to answer   Food Insecurity: Unknown (10/6/2024)    Food Insecurity     Within the past 12  months, did you worry that your food would run out before you got money to buy more?: Patient unable to answer     Within the past 12 months, did the food you bought just not last and you didn t have money to get more?: Patient unable to answer   Transportation Needs: Unknown (10/6/2024)    Transportation Needs     Within the past 12 months, has lack of transportation kept you from medical appointments, getting your medicines, non-medical meetings or appointments, work, or from getting things that you need?: Patient unable to answer   Housing Stability: Unknown (10/6/2024)    Housing Stability     Do you have housing? : Patient unable to answer     Are you worried about losing your housing?: Patient unable to answer     Patient denies smoking, no significant alcohol intake, denies illicit drugs use       Family History:   No family history on file.  Reviewed and not felt to be contributory.        Home Medications:     Prior to Admission Medications   Prescriptions Last Dose Informant Patient Reported? Taking?   Multiple Vitamins-Minerals (MULTIVITAMIN MEN 50+) TABS 10/4/2024 at AM Nursing Home Yes Yes   Sig: Take 1 tablet by mouth daily.   QUEtiapine (SEROQUEL) 25 MG tablet PRN Nursing Home Yes Yes   Sig: Take 25 mg by mouth every 6 hours as needed (AGITATION).   Vitamin D, Cholecalciferol, 25 MCG (1000 UT) CAPS 10/4/2024 at AM Nursing Home Yes Yes   Sig: Take 1 capsule by mouth daily.   acetaminophen (TYLENOL) 500 MG tablet PRN Nursing Home No Yes   Sig: Take 2 tablets (1,000 mg) by mouth every 6 hours as needed for pain   amLODIPine (NORVASC) 5 MG tablet 10/4/2024 at AM Nursing Home Yes Yes   Sig: Take 5 mg by mouth daily   aspirin 81 MG EC tablet Unknown Nursing Home Yes Yes   Sig: Take 81 mg by mouth. ON WEDNESDAYS   bimatoprost (LUMIGAN) 0.01 % SOLN 10/3/2024 at PM Nursing Home Yes Yes   Sig: Place 1 drop Into the left eye At Bedtime   calcium carbonate (TUMS) 500 MG chewable tablet PRN Nursing Home Yes Yes    Sig: Take 2 chew tab by mouth every 6 hours as needed for heartburn.   carboxymethylcellulose PF (REFRESH PLUS) 0.5 % ophthalmic solution PRN Nursing Home Yes Yes   Sig: Place 1-2 drops into both eyes 4 times daily as needed for dry eyes.   dorzolamide-timolol (COSOPT) 2-0.5 % ophthalmic solution 10/4/2024 at AM Nursing Home Yes Yes   Sig: Place 1 drop into both eyes 2 times daily   netarsudil (RHOPRESSA) 0.02 % ophthalmic solution 10/3/2024 at PM Nursing Home Yes Yes   Sig: Place 1 drop into the right eye At Bedtime   rivastigmine (EXELON) 1.5 MG capsule 10/4/2024 at AM Nursing Home Yes Yes   Sig: Take 1.5 mg by mouth 2 times daily.   rosuvastatin (CRESTOR) 10 MG tablet 10/4/2024 at AM Nursing Home Yes Yes   Sig: Take 10 mg by mouth daily      Facility-Administered Medications: None          Allergy:     Allergies   Allergen Reactions    Iodinated Contrast Media Hives     DYE  DYE      Aspirin Buffered Unknown and GI Disturbance    Brimonidine Other (See Comments)     stomach ache, head ache, sore muscles  Eye burning      Fd&C Blue #2 (Indigotine) Hives    Ibuprofen Unknown     Stomach ulcers and bleeding      Aspirin GI Disturbance and Itching     Gi bleed; not an allergy  Okay for low dose asa 81mg M W F  Gi bleed; not an allergy  Okay for low dose asa 81mg M W F      Erythromycin Rash    Penicillins Rash          Inpatient Medications:   Scheduled Meds:  Current Facility-Administered Medications   Medication Dose Route Frequency Provider Last Rate Last Admin    amLODIPine (NORVASC) tablet 5 mg  5 mg Oral Daily Markie Nunez MD        [START ON 10/9/2024] aspirin EC tablet 81 mg  81 mg Oral Weekly Markie Nunez MD        bimatoprost (LUMIGAN) 0.01 % ophthalmic drops 1 drop  1 drop Left Eye At Bedtime Hector Patel MD   1 drop at 10/06/24 0267    dorzolamide-timolol (COSOPT) ophthalmic solution 1 drop  1 drop Both Eyes BID Hector Patel MD   1 drop at 10/06/24 0813    netarsudil  (RHOPRESSA) 0.02 % ophthalmic solution 1 drop  1 drop Right Eye At Bedtime Hector Patel MD   1 drop at 10/06/24 2237    rivastigmine (EXELON) capsule 1.5 mg  1.5 mg Oral BID Markie Nunez MD        rosuvastatin (CRESTOR) tablet 10 mg  10 mg Oral Daily Markie Nunez MD         PRN Meds:   Current Facility-Administered Medications   Medication Dose Route Frequency Provider Last Rate Last Admin    acetaminophen (TYLENOL) tablet 650 mg  650 mg Oral Q4H PRN Hector Patel MD        Or    acetaminophen (TYLENOL) Suppository 650 mg  650 mg Rectal Q4H PRN Hector Patel MD        bisacodyl (DULCOLAX) suppository 10 mg  10 mg Rectal Daily PRN Hector Patel MD        carboxymethylcellulose PF (REFRESH PLUS) 0.5 % ophthalmic solution 1-2 drop  1-2 drop Both Eyes 4x Daily PRN Hector Patel MD        docusate sodium (COLACE) capsule 100 mg  100 mg Oral BID PRN Hector Patel MD        haloperidol lactate (HALDOL) injection 2 mg  2 mg Intravenous Q6H PRN Hector Patel MD   2 mg at 10/07/24 0544    hydrALAZINE (APRESOLINE) injection 10 mg  10 mg Intravenous Q4H PRN Markie Nunez MD        melatonin tablet 3 mg  3 mg Oral At Bedtime PRN Hector Patel MD        ondansetron (ZOFRAN ODT) ODT tab 4 mg  4 mg Oral Q6H PRN Hector Patel MD        Or    ondansetron (ZOFRAN) injection 4 mg  4 mg Intravenous Q6H PRN Hector Patel MD        QUEtiapine (SEROquel) tablet 25 mg  25 mg Oral Q6H PRN Hector Patel MD   25 mg at 10/06/24 0653             Physical Exam:   Physical Exam   Vitals:  Height:Data Unavailable  Weight:0 lbs 0 oz   Temp: 97.7  F (36.5  C) Temp src: Axillary BP: (!) 148/75 Pulse: 74   Resp: 16 SpO2: 92 % O2 Device: None (Room air)    General Appearance: No acute distress, normal body habitus  Neuro Exam:  Mental Status Exam: Awakens to sternal rub.  Patient does not currently have his hearing aids in.  Patient does  not follow simple commands such as thumbs up or wiggle toes.  Had difficulty understanding patient due to dysarthria.    Cranial Nerves: Looks around the room with no gaze palsy.  Resists eyelid opening.  Facial strength and sensation is normal. No jaw or tongue deviation.  Motor: Moves his arms and legs at least antigravity, does not reliably perform for confrontational testing.    Reflexes: Symmetrically intact with absent reflexes at the Achilles and patella. Plantar signs mute.  Sensory: Patient withdrawals to the pinch of his arms and legs bilaterally  Coordination: Unable to test  Gait: Unable to test  Neck: No nuchal rigidity  Extremities: No clubbing, no cyanosis, no edema          Data:   ROUTINE IP LABS   CBC RESULTS:     Recent Labs   Lab 10/04/24  1247   WBC 8.0   RBC 4.93   HGB 14.8   HCT 45.8        Basic Metabolic Panel:   Recent Labs   Lab Test 10/04/24  1247 11/03/22  1832    140   POTASSIUM 4.0 3.8   CHLORIDE 108* 110*   CO2 24 24   BUN 15.4 27   CR 0.79 0.92   GLC 93 106*   SMILEY 8.1* 8.9     Liver panel:  Recent Labs   Lab Test 10/04/24  1247   PROTTOTAL 5.6*   ALBUMIN 3.3*   BILITOTAL 0.4   ALKPHOS 88   AST 18   ALT 11     Lipid Profile:No lab results found.  Thyroid Panel:No lab results found.   Vitamin B12: No lab results found.        IMAGING:   Independent interpretation of the following studies by myself as part of today's encounter.   CT head 10/4/24:   --IMPRESSION:  Diffuse cerebral volume loss and cerebral white matter  changes consistent with chronic small vessel ischemic disease. No  evidence for acute intracranial pathology.      Radiation dose for this scan was reduced using automated exposure  control, adjustment of the mA and/or kV according to patient size, or  iterative reconstruction technique.    MRI brain w/wo con 10/2018  IMPRESSION:    1. No evidence for acute infarct.   2. Moderate chronic microvascular ischemic changes involving the cerebral white matter.   3.  Small chronic lacunar infarct involving the left centrum semiovale.   4. Mild volume loss.   5. No abnormal intra-axial enhancement     Time:  60 minutes evaluation and management.     Doreen Brewster M.D.  [facility]  Office [phone]

## 2024-10-07 NOTE — PROGRESS NOTES
Care Management Follow Up    Length of Stay (days): 0    Expected Discharge Date: 10/08/2024     Concerns to be Addressed:       Patient plan of care discussed at interdisciplinary rounds: Yes    Anticipated Discharge Disposition:                Anticipated Discharge Services:    Anticipated Discharge DME:      Patient/family educated on Medicare website which has current facility and service quality ratings:    Education Provided on the Discharge Plan:    Patient/Family in Agreement with the Plan:      Referrals Placed by CM/SW:    Private pay costs discussed: Not applicable    Discussed  Partnership in Safe Discharge Planning  document with patient/family: No     Handoff Completed: No, handoff not indicated or clinically appropriate    Additional Information:  Left message for [facility] at [phone] to discharge plan for patient. It appears he would need a higher level of care at the facility and this would have to be discussed with family. Checking with facility to see if this has been done by the facility.    Next Steps: follow for discharge planning.    Naty Small RN

## 2024-10-07 NOTE — PROGRESS NOTES
[facility]    Medicine Progress Note - Hospitalist Service        Date of Admission:  10/5/2024 10:20 PM    Assessment & Plan:   Suleiman Spain is a markedly pleasant 89 year old gentleman with past medical history that is most notable for advanced dementia, who presents with acute generalized weakness and acute metabolic encephalopathy.     Generalized weakness   Acute delirium  Advanced dementia  -Patient presents with several days of generalized weakness with worsening agitation and delirium.  He was in the emergency room from 10//2024 and initially started care process was initiated, however he was admitted under observation for worsening weakness and delirium.  -On admission he was also found to be dehydrated   -no other reversible causes identified  -Son and patient's long-term care facility seem to think that there has been an abrupt decline lately.  It appears like patient was diagnosed with dementia back in July although he was already quite symptomatic at that point  -Initial workup was negative including normal electrolytes and normal urine analysis  -Due to concerns from facility and son regarding abrupt decline will get second opinion from neurology.  Although I suspect that this is dementia that is just progressed over time     Dysphagia  -Nursing staff have noticed concerns with cough with oral intake  -Patient was evaluated by SLP and they recommend higher level of care given severe cognitive deficit and ongoing dysphagia management.  They recommend full liquid diet with direct staff supervision for feeding assisted.  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 .     Prior CVA  Hypertension  Hyperlipidemia  -Resume prior to admission aspirin, statin  -Resume prior to admission amlodipine     Goals of care:  -Initially I thought patient had dementia that has  progressed along to advanced age.  Family however mention today that the decline was more recent and abrupt although I have not been able to talk to the daughter who is most involved with the patient's care.  She is not reachable on the phone at the moment.  Depending on neurology inputs would recommend memory care or alternatively hospice    Diet: Full Liquid Diet Thin Liquids (level 0) (Direct staff  supervision, small sips, upright position)     DVT Prophylaxis: Pneumatic Compression Devices   Linder Catheter: Not present  Code Status: No CPR- Do NOT Intubate     Disposition Plan       Expected Discharge Date: 10/08/2024      Destination: assisted living  Discharge Comments: CM/SW  speech rec higher level of care, supervision   pt from [facility]      Entered: Markie Nunez MD 10/07/2024, 11:39 AM        Medically Ready for Discharge: Anticipated Tomorrow       Clinically Significant Risk Factors Present on Admission                # Drug Induced Platelet Defect: home medication list includes an antiplatelet medication               # Financial/Environmental Concerns: none              The patient's care was discussed with the Bedside Nurse, Patient, and Patient's Family.    Medical Decision Making       **CLEAR ALL SELECTIONS**      Labs/Imaging Reviewed:  See Information above and Data section below    Time SPENT BY ME on the date of service doing chart review, history, exam, documentation & further activities per the note:  35 MINUTES    Chart documentation was completed, in part, with [employer] voice-recognition software. Even though reviewed, some grammatical, spelling, and word errors may remain.    Markie Nunez MD  Hospitalist Service  [facility]  Text Page 7AM-6PM  Securely message with the Vocera Web Console (learn more here)  Text page via [employer] Paging/Directory    ______________________________________________________________________    Interval History   No  "significant clinical changes overnight.  He is now on long-arm.  Son and facility reportedly have concerned that the decline has been more abrupt and recent.    Data reviewed today: I reviewed all medications, new labs and imaging results over the last 24 hours. I personally reviewed no images or EKG's today.    Physical Exam   Vital signs:  Temp: 97.7  F (36.5  C) Temp src: Axillary BP: (!) 148/75 Pulse: 74   Resp: 16 SpO2: 92 % O2 Device: None (Room air)        Estimated body mass index is 25.58 kg/m  as calculated from the following:    Height as of 11/3/22: 1.702 m (5' 7\").    Weight as of 11/4/22: 74.1 kg (163 lb 4.8 oz).      Wt Readings from Last 2 Encounters:   11/04/22 74.1 kg (163 lb 4.8 oz)   07/07/21 77.6 kg (171 lb)       Gen: Awake, confused, disoriented, relatively calm   Resp: CTA B/L, normal WOB  CVS: RRR, no murmur  Abd/GI: Soft, non-tender. BS- normoactive.   MSK: no pedal edema  Neuro- CN- intact. No focal deficits.        Data   Recent Labs   Lab 10/04/24  1247   WBC 8.0   HGB 14.8   MCV 93         POTASSIUM 4.0   CHLORIDE 108*   CO2 24   BUN 15.4   CR 0.79   ANIONGAP 9   SMILEY 8.1*   GLC 93   ALBUMIN 3.3*   PROTTOTAL 5.6*   BILITOTAL 0.4   ALKPHOS 88   ALT 11   AST 18       No results found for this or any previous visit (from the past 24 hour(s)).  Medications   Current Facility-Administered Medications   Medication Dose Route Frequency Provider Last Rate Last Admin     Current Facility-Administered Medications   Medication Dose Route Frequency Provider Last Rate Last Admin    amLODIPine (NORVASC) tablet 5 mg  5 mg Oral Daily Markie Nunez MD        [START ON 10/9/2024] aspirin EC tablet 81 mg  81 mg Oral Weekly Markie Nunez MD        bimatoprost (LUMIGAN) 0.01 % ophthalmic drops 1 drop  1 drop Left Eye At Bedtime Hector Patel MD   1 drop at 10/06/24 9447    dorzolamide-timolol (COSOPT) ophthalmic solution 1 drop  1 drop Both Eyes BID Hector Patel MD   " 1 drop at 10/06/24 0813    netarsudil (RHOPRESSA) 0.02 % ophthalmic solution 1 drop  1 drop Right Eye At Bedtime Hector Patel MD   1 drop at 10/06/24 2237    rivastigmine (EXELON) capsule 1.5 mg  1.5 mg Oral BID Markie Nunez MD        rosuvastatin (CRESTOR) tablet 10 mg  10 mg Oral Daily Markie Nunez MD

## 2024-10-07 NOTE — PROGRESS NOTES
Observation goals  PRIOR TO DISCHARGE        Comments: -diagnostic tests and consults completed and resulted=Not Met, SW/PT to see  -vital signs normal or at patient baseline=Met  -tolerating oral intake to maintain hydration=Not Met, refusing to eat/drink  -returns to baseline functional status=Not Met, total care currently  -safe disposition plan has been identified=Met, accepted to [facility] per son  Nurse to notify provider when observation goals have been met and patient is ready for discharge.

## 2024-10-07 NOTE — CONSULTS
Care Management Initial Consult    General Information  Assessment completed with: Care Team Member, Angela Patterson Traci  Type of CM/SW Visit: Initial Assessment    Primary Care Provider verified and updated as needed: Yes   Readmission within the last 30 days: no previous admission in last 30 days      Reason for Consult: discharge planning  Advance Care Planning:            Communication Assessment  Patient's communication style: spoken language (English or Bilingual)    Hearing Difficulty or Deaf: no        Cognitive  Cognitive/Neuro/Behavioral: .WDL except, orientation, speech  Level of Consciousness: confused  Arousal Level: arouses to touch/gentle shaking  Orientation: disoriented x 4  Mood/Behavior: calm  Best Language: 0 - No aphasia  Speech: garbled, illogical    Living Environment:   People in home: facility resident     Current living Arrangements: assisted living      Able to return to prior arrangements: other (see comments) (pending facility desicsions)       Family/Social Support:  Care provided by: self  Provides care for: no one  Marital Status:   Support system:            Description of Support System:           Current Resources:   Patient receiving home care services: No        Community Resources: None  Equipment currently used at home: walker, rolling  Supplies currently used at home:      Employment/Financial:  Employment Status: retired        Financial Concerns: none           Does the patient's insurance plan have a 3 day qualifying hospital stay waiver?  No    Lifestyle & Psychosocial Needs:  Social Determinants of Health     Food Insecurity: Unknown (10/6/2024)    Food Insecurity     Within the past 12 months, did you worry that your food would run out before you got money to buy more?: Patient unable to answer     Within the past 12 months, did the food you bought just not last and you didn t have money to get more?: Patient unable to answer   Depression: Not at risk  (9/19/2023)    Received from [facility]    PHQ-2     PHQ-2 Score: 0   Housing Stability: Unknown (10/6/2024)    Housing Stability     Do you have housing? : Patient unable to answer     Are you worried about losing your housing?: Patient unable to answer   Tobacco Use: Medium Risk (9/19/2024)    Received from [facility]    Patient History     Smoking Tobacco Use: Former     Smokeless Tobacco Use: Never     Passive Exposure: Not on file   Financial Resource Strain: Unknown (10/6/2024)    Financial Resource Strain     Within the past 12 months, have you or your family members you live with been unable to get utilities (heat, electricity) when it was really needed?: Patient unable to answer   Alcohol Use: Not on file   Transportation Needs: Unknown (10/6/2024)    Transportation Needs     Within the past 12 months, has lack of transportation kept you from medical appointments, getting your medicines, non-medical meetings or appointments, work, or from getting things that you need?: Patient unable to answer   Physical Activity: Not on file   Interpersonal Safety: Not on file   Stress: Not on file   Social Connections: Not on file   Health Literacy: Not on file       Functional Status:  Prior to admission patient needed assistance:   Dependent ADLs:: Ambulation-cane  Dependent IADLs:: Cleaning, Medication Management, Transportation, Money Management       Mental Health Status:  Mental Health Status: Current Concern       Chemical Dependency Status:                Values/Beliefs:  Spiritual, Cultural Beliefs, Anabaptist Practices, Values that affect care: no               Discussed  Partnership in Safe Discharge Planning  document with patient/family: No    Additional Information:  Writer placed call to sonDaisy, who called the SW in the ED. Left a message to call writer back. Writer then received call from Tiffanie barron. They tell writer the patient is in a different state than when he was on  "Thursday. Angela tells writer, \"I saw him walking to the mailbox with his cane. He normally will walk to the memory care unit to see his wife.\" They tell writer they did not know he had a diagnosis of Dementia which seems new since September. They did however receive a new medication with Dementia listed as the reason for taking it, Exelon. Yesenia told writer that he sounds like he is total care now and refusing everything. \"Will he be on Hospice?\" Writer looked at notes and noted family did not agree to this. Writer faxed over notes for [place] to review.  Writer then spoke to son, Sheryl. He normally is not the decision maker for patient, Marvin is, patient's daughter. Marvin is out of town until 10/10/24. Daisy wants to know if writer had spoken to patient's facility. Writer explained that the facility feels he is well below his baseline and would have to review notes/do an assessment and speak to family. Daisy will call facility to check in with them.  Writer spoke to MD following patient today. Per [facility],this is a big change in patient.  General Neurology to see patient.  CM team called phone [phone] and spoke with/left message for Angela Patterson to request background information on pt's place of living.      In what kind of facility does pt reside (SNF, [facility], group home)? [facility]  Name of building/unit: [facility]   What do you know about pt's baseline cognition and mobility? ALert , independently walking        What level of cognition/mobility is required for safe return? Laert to self , place, has had episodes of wandering outside at night   Is resident enrolled in any services?  Medication management      (ask about: meals, physical assist with ADLs, med administration, housekeeping, and if they have built in or wearable call buttons)   Are there grab bars in the bathroom, shower, and/or shower chair in unit? Describe: yes  Does pt have any personal DME? cane   Best number to reach facility nursing? Phone " [phone]  Evening/weekend number? same  Fax: [phone]   What does the facility need faxed to them from us at discharge? Discharge orders   Does facility manage medications?  yes   If yes, any contracted pharmacy? Name: possibly [facility]???         If new Rx meds at discharge, fill at hospital pharmacy or contracted pharmacy?  TBD  Does [facility] have a copy of any Health Care Directive of [facility]? yes           Explained we can provide therapy notes and information when it is available and provided callback contact information.         Next Steps: follow for patient to go back to [facility] in the Memory care vs. Needing TCU?    Naty Small RN

## 2024-10-07 NOTE — PROGRESS NOTES
PRIMARY Concern: AMS, weakness  SAFETY RISK Concerns (fall risk, behaviors, etc.): fall risk      Aggression Tool Color: yellow. Tries to grab arms, attempts to hit staff w/ arms and legs   Isolation/Type: none  Tests/Procedures for NEXT shift: AM labs,   Consults? (Pending/following, signed-off?) SLP, CM/SW following  Where is patient from? (Home, TCU, etc.): [facility]  Other Important info for NEXT shift: Hospitalist talked to son to discuss about hospice care and son reported [facility]. Son reported he will consider hospice after giving a trial at [facility]. Unable to swallow pills, risk for aspiration. Refused all medications, spit at staff.  Anticipated DC date & active delays: TBD  _____________________________________________________________________________  SUMMARY NOTE:  Orientation/Cognitive: Disoriented x4, speech is garbled, hard to understand.   Observation Goals (Met/ Not Met): not met  Mobility Level/Assist Equipment: A2 w/ lift.   Antibiotics & Plan (IV/po, length of tx left): none  Pain Management: None  Tele/VS/O2: VSS on RA ex HTN  ABNL Lab/BG: None this shift  Diet: High risk for aspiration. Full liq diet and thin liq per speech rec  Bowel/Bladder: PVR over 400, straight cathed for 425ml, no BM  Skin Concerns: Scattered bruising, dry/flaky  Drains/Devices: PIV SL   Patient Stated Goal for Today: Unable to give goal

## 2024-10-07 NOTE — PROGRESS NOTES
Observation goals  PRIOR TO DISCHARGE        Comments:   -diagnostic tests and consults completed and resulted Not Met  -vital signs normal or at patient baseline Not Met  -tolerating oral intake to maintain hydration Not Met, refusing to eat/drink  -returns to baseline functional status Not Met, total care currently  -safe disposition plan has been identified Met, accepted to [place] memory care per son  Nurse to notify provider when observation goals have been met and patient is ready for discharge.

## 2024-10-07 NOTE — PROGRESS NOTES
PRIMARY Concern: AMS, weakness  SAFETY RISK Concerns (fall risk, behaviors, etc.): fall risk      Aggression Tool Color: yellow, attempts to hit staff w/ arms and legs   Isolation/Type: none  Tests/Procedures for NEXT shift: None  Consults? (Pending/following, signed-off?) SW pending   Where is patient from? (Home, TCU, etc.): longterm  Other Important info for NEXT shift:  Unable to swallow pills, risk for aspiration  Anticipated DC date & active delays: TBD  _____________________________________________________________________________  SUMMARY NOTE:  Orientation/Cognitive: Disoriented x4  Observation Goals (Met/ Not Met): not met  Mobility Level/Assist Equipment: A2 w/ lift  Antibiotics & Plan (IV/po, length of tx left): none  Pain Management: No pain  Tele/VS/O2: VSS on RA ex HTN  ABNL Lab/BG: No new labs   Diet: Full liq   Bowel/Bladder: Incont, retaining   Skin Concerns: Scattered bruising, dry/flaky  Drains/Devices: PIV SL   Patient Stated Goal for Today: SANDRA        Observation goals  PRIOR TO DISCHARGE        Comments:   -diagnostic tests and consults completed and resulted Not Met  -vital signs normal or at patient baseline Not Met  -tolerating oral intake to maintain hydration Not Met, refusing to eat/drink  -returns to baseline functional status Not Met, total care currently  -safe disposition plan has been identified Met, accepted to [place] memory care per son  Nurse to notify provider when observation goals have been met and patient is ready for discharge.

## 2024-10-07 NOTE — PLAN OF CARE
Goal Outcome Evaluation:  RIMARY Concern: AMS, weakness  SAFETY RISK Concerns (fall risk, behaviors, etc.): fall risk      Aggression Tool Color: yellow  Isolation/Type: none  Tests/Procedures for NEXT shift: None  Consults? (Pending/following, signed-off?) SW , Neurology  Where is patient from? (Home, TCU, etc.): [facility]  Other Important info for NEXT shift:  Unable to swallow pills, risk for aspiration  Anticipated DC date & active delays: TBD  _____________________________________________________________________________  SUMMARY NOTE:  Orientation/Cognitive: confused  Observation Goals (Met/ Not Met): not met  Mobility Level/Assist Equipment: A2 w/ lift  Antibiotics & Plan (IV/po, length of tx left): none  Pain Management: No pain  Tele/VS/O2: VSS on RA Except HTN  ABNL Lab/BG: No new labs   Diet:Full liquid  Bowel/Bladder: Incont, retaining  straight cath  650  Skin Concerns: Scattered bruising, dry/flaky  Drains/Devices: PIV SL   Patient Stated Goal for Today: SANDRA           Observation goals  PRIOR TO DISCHARGE       Comments:   -diagnostic tests and consults completed and resulted Not Met  -vital signs normal or at patient baseline  Met  -tolerating oral intake to maintain hydration Not Met, poor appetite  -returns to baseline functional status Not Met, total care currently  -safe disposition plan has been identified not met

## 2024-10-07 NOTE — PROVIDER NOTIFICATION
Observation goals  PRIOR TO DISCHARGE       Comments:   -diagnostic tests and consults completed and resulted: Met  -vital signs normal or at patient baseline Partially met  -tolerating oral intake to maintain hydration Not Met, refusing to eat/drink  -returns to baseline functional status Not Met, total care currently  -safe disposition plan has been identified Met  Nurse to notify provider when observation goals have been met and patient is ready for discharge

## 2024-10-07 NOTE — PROVIDER NOTIFICATION
MD Notification    Notified Person: MD    Notified Person Name: Nicole    Notification Date/Time: 9:04pm    Notification Interaction: Vocera message    Purpose of Notification: Patient refusing all medications, spit at staff    Orders Received: Acknowledged     Comments:

## 2024-10-08 NOTE — PROGRESS NOTES
10/08/24 1400   Appointment Info   Signing Clinician's Name / Credentials (PT) Shahbaz Lindsey DPT       Present no   Living Environment   Living Environment Comments Pt alert to self, poor historian, information obtained through chart review. Pt lives in shelter, unsure of level of assist pt receives   Self-Care   Current Activity Tolerance poor   Regular Exercise No   Equipment Currently Used at Home walker, rolling   Activity/Exercise/Self-Care Comment Pt unable to state what PLOF was. Appears to require use of FWW at baseline.   General Information   Onset of Illness/Injury or Date of Surgery 10/08/24   Referring Physician Markie Nunez MD   Patient/Family Therapy Goals Statement (PT) Pt did not state   Pertinent History of Current Problem (include personal factors and/or comorbidities that impact the POC) Per Chart: Suleiman Spain is a markedly pleasant 89 year old gentleman with past medical history that is most notable for advanced dementia, who presents with acute generalized weakness and acute metabolic encephalopathy.   Existing Precautions/Restrictions fall   Weight-Bearing Status - LLE full weight-bearing   Weight-Bearing Status - RLE full weight-bearing   Cognition   Orientation Status (Cognition) disoriented to;place;situation;time   Cognitive Status Comments Pt alert to self only, not consistently repsonding or following commands   Pain Assessment   Patient Currently in Pain No   Integumentary/Edema   Integumentary/Edema no deficits were identifed   Posture    Posture Forward head position;Protracted shoulders   Range of Motion (ROM)   Range of Motion ROM is WFL   Strength (Manual Muscle Testing)   Strength (Manual Muscle Testing) Deficits observed during functional mobility   Strength Comments Not formally assessed as pt would not follow commands, required A x 2 for transfers   Bed Mobility   Comment, (Bed Mobility) Supine>sit w/ mod A x 1   Transfers   Comment, (Transfers)  ----- Message from Yaritza Lew RN sent at 1/11/2023  8:56 AM CST -----  Regarding: INR  Damon will have his INR obtained on his home monitor on Wednesday 1/18/23; 1 week recheck.      Sit>stand w/ FWW and max A x 2   Gait/Stairs (Locomotion)   Comment, (Gait/Stairs) Unable to initiate   Balance   Balance Comments Adequate static sitting balance; pt unsteady with OOB activity   Sensory Examination   Sensory Perception patient reports no sensory changes   Clinical Impression   Criteria for Skilled Therapeutic Intervention Yes, treatment indicated   PT Diagnosis (PT) Impaired gait   Influenced by the following impairments Decreased activity tolerance; decreased balance; decreased strength   Functional limitations due to impairments Impaired functional mobility   Clinical Presentation (PT Evaluation Complexity) stable   Clinical Presentation Rationale Clinical judgement   Clinical Decision Making (Complexity) low complexity   Planned Therapy Interventions (PT) balance training;bed mobility training;gait training;patient/family education;strengthening;transfer training;progressive activity/exercise   Risk & Benefits of therapy have been explained evaluation/treatment results reviewed;care plan/treatment goals reviewed;risks/benefits reviewed;current/potential barriers reviewed;participants voiced agreement with care plan;participants included;patient   PT Total Evaluation Time   PT Eval, Low Complexity Minutes (05463) 10   Physical Therapy Goals   PT Frequency 3x/week   PT Predicted Duration/Target Date for Goal Attainment 10/14/24   PT Goals Bed Mobility;Gait;Transfers   PT: Bed Mobility Supervision/stand-by assist;Supine to/from sit   PT: Transfers Supervision/stand-by assist;Sit to/from stand;Assistive device   PT: Gait Supervision/stand-by assist;Assistive device;100 feet   Interventions   Interventions Quick Adds Therapeutic Activity   Therapeutic Activity   Therapeutic Activities: dynamic activities to improve functional performance Minutes (07798) 30   Symptoms Noted During/After Treatment Fatigue   Treatment Detail/Skilled Intervention Greeted pt supine in bed, agreed to PT. VSS on RA  throughout session. Pt alert to self only, not consistently following commands. Pt performed supine>sit w/ mod A x 1, needing assist to safely lift BLEs off of bed and trunk control. In addition, pt required assist to initiate movement. Additional assist required to scoot to EOB. Pt able to sit at EOB unsupported without LOB. Pt performed sit>stand x 3 w/ FWW and max A x 2, needing assist to stand fully upright. Pt unsteady in standing, presenting with crouched posture, unable to correct despite cues. Pt able to perform stand pivot transfer w/ max A x 2. Dependent for hygiene and pericares. Pt required max A x 2 to return back to bed, needing assist to safely lift BLEs back into bed and trunk control. Pt ended session supine in bed, with all needs met and CNA present.   PT Discharge Planning   PT Plan Bed mobility; repeat sit>stands; pre-gait exercises; initiate gait w/ FWW and WC follow as appropriate   PT Discharge Recommendation (DC Rec) Long term care facility;Transitional Care Facility   PT Rationale for DC Rec Pt appears to be below baseline. Unable to determine PLOF as pt is alert to self. Pt currently requiring max A x 2 for OOB activity, unable to safely attempt ambulation. Pt would benefit from continued skilled PT services via TCU to address deficits and improve IND with safety and functional mobility. If pt were to return to [facility], would require lift for transfers. In addition, pt would likely require higher level of care such as memory care [facility] after TCU stay.   PT Brief overview of current status Goals of therapy will be to address safe mobility and make recs for d/c to next level of care. Pt and RN will continue to follow all falls risk precautions as documented by RN staff while hospitalized.   Total Session Time   Timed Code Treatment Minutes 30   Total Session Time (sum of timed and untimed services) 40

## 2024-10-08 NOTE — PROGRESS NOTES
PRIMARY Concern: AMS, weakness  SAFETY RISK Concerns (fall risk, behaviors, etc.): fall risk, Aspiration precaution.  Aggression Tool Color: green  Isolation/Type: none  Tests/Procedures for NEXT shift: None  Consults? (Pending/following, signed-off?) Neurology, SW/CC following.  Where is patient from? (Home, TCU, etc.): [place] [facility]  Other Important info for NEXT shift:  Pt intermittently somnolent.   Anticipated DC date & active delays: TBD   ______________________________________________________________________  SUMMARY NOTE:  Orientation/Cognitive: Confused. Disoriented x4. Keeps taking off his gown. Calm through out the shift.  Observation Goals (Met/ Not Met): not met  Mobility Level/Assist Equipment: A2 w/ lift. T/R.  Antibiotics & Plan (IV/po, length of tx left): none  Pain Management: No pain, pt resting comfortably.  Tele/VS/O2: VSS on RA  ABNL Lab/BG: No new labs today.  Diet: Full liquid.Total assist with feeding. Up right position, small sips. Poor appetite.   Bowel/Bladder: Incont, bladder scanned 220 ml.   Skin Concerns: Scattered bruising, dry/flaky  Drains/Devices: PIV SL   Patient Stated Goal for Today: SANDRA    Observation goals  PRIOR TO DISCHARGE        Comments: -diagnostic tests and consults completed and resulted- not met  -vital signs normal or at patient baseline- met  -tolerating oral intake to maintain hydration- partially met, poor intake.  -returns to baseline functional status- not met. Total assist.  -safe disposition plan has been identified-not met. SW/CC following.  Nurse to notify provider when observation goals have been met and patient is ready for discharge.

## 2024-10-08 NOTE — UTILIZATION REVIEW
Concurrent stay review; Secondary Review Determination - [facility]        Under the authority of the Utilization Management Committee, the utilization review process indicated a secondary review on the above patient.  The review outcome is based on review of the medical records, discussions with staff, and applying clinical experience noted on the date of the review.        (x) Outpatient long term status is appropriate       RATIONALE FOR DETERMINATION:   89 yo man who presented with generalized weakness, cognitive impairment. Awaiting decision on facility placement--back to assisted living with more services vs LTC or memory care.     Patient delayed discharge is related to disposition, there is no medical necessity for inpatient admission at the time of this review. If there is a change in patient status, please resend for review.    The information on this document is developed by the utilization review team in order for the business office to ensure compliance.  This only denotes the appropriateness of proper admission status and does not reflect the quality of care rendered.       The definitions of Inpatient Status and Observation Status used in making the determination above are those provided in the CMS Coverage Manual, Chapter 1 and Chapter 6, section 70.4.       Sincerely,    Brisa Dunaway MD

## 2024-10-08 NOTE — PROGRESS NOTES
diagnostic tests and consults completed and resulted Not met, MRI pending, neurology following  -vital signs normal or at patient baseline met  -tolerating oral intake to maintain hydration In progress  -returns to baseline functional status not met, AX2 with lift/ W  -safe disposition plan has been identified not met, TCU vs LTC recommended by therapy pending goals of care, SW following,

## 2024-10-08 NOTE — PROGRESS NOTES
[facility]    Medicine Progress Note - Hospitalist Service        Date of Admission:  10/5/2024 10:20 PM    Assessment & Plan:   Suleiman Spain is a markedly pleasant 89 year old gentleman with past medical history that is most notable for advanced dementia, who presents with acute generalized weakness and acute metabolic encephalopathy.     Generalized weakness   Acute delirium  Advanced dementia  -Patient presents with several days of generalized weakness with worsening agitation and delirium.  He was in the emergency room from 10//2024 and initially started care process was initiated, however he was admitted under observation for worsening weakness and delirium.  -On admission he was also found to be dehydrated   -no other reversible causes identified, UA unremarkable, chest x-ray with atelectasis, procalcitonin negative I do not favor this to be a pneumonia.  -Son and patient's long-term care facility felt that this was more of an abrupt decline although it appears like patient had ongoing symptoms of dementia for a while  -General Neurology consulted, they recommended home holding rivastigmine as this could have contributed to his recent decline  -Current plan is for patient to discharge to memory care unit, social work assisting.  I did offer hospice on discussion with the son on 10/6/2024, he would first want to try memory care and see how his dad did before considering hospice.     Dysphagia  -Nursing staff have noticed concerns with cough with oral intake  -Patient was evaluated by SLP and they recommend higher level of care given severe cognitive deficit and ongoing dysphagia management.  They recommend full liquid diet with direct staff supervision for feeding assisted.  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 .     Prior CVA  Hypertension  Hyperlipidemia  -Resume  prior to admission aspirin, statin  -Resume prior to admission amlodipine     Diet: Full Liquid Diet Thin Liquids (level 0) (Direct staff  supervision, small sips, upright position)     DVT Prophylaxis: Pneumatic Compression Devices   Linder Catheter: Not present  Code Status: No CPR- Do NOT Intubate     Disposition Plan       Expected Discharge Date: 10/08/2024      Destination: assisted living  Discharge Comments: CM/SW  speech rec higher level of care, supervision   pt from [facility]  Chest xray      Entered: Markie Nunez MD 10/08/2024, 11:38 AM        Medically Ready for Discharge: Ready for now, pending placement       Clinically Significant Risk Factors Present on Admission                # Drug Induced Platelet Defect: home medication list includes an antiplatelet medication               # Financial/Environmental Concerns: none              The patient's care was discussed with the Bedside Nurse, Patient, and Patient's Family.    Medical Decision Making       **CLEAR ALL SELECTIONS**      Labs/Imaging Reviewed:  See Information above and Data section below    Time SPENT BY ME on the date of service doing chart review, history, exam, documentation & further activities per the note:  35 MINUTES    Chart documentation was completed, in part, with [employer] voice-recognition software. Even though reviewed, some grammatical, spelling, and word errors may remain.    Markie Nunez MD  Hospitalist Service  [facility]  Text Page 7AM-6PM  Securely message with the Vocera Web Console (learn more here)  Text page via [employer] Paging/Directory    ______________________________________________________________________    Interval History   No acute events overnight.  No new nursing concerns.  He is afebrile.  Continues to be confused and disoriented and intermittently restless.    Data reviewed today: I reviewed all medications, new labs and imaging results over the last 24 hours. I  "personally reviewed no images or EKG's today.    Physical Exam   Vital signs:  Temp: 98.2  F (36.8  C) Temp src: Axillary BP: 133/66 Pulse: 66   Resp: 18 SpO2: 95 % O2 Device: None (Room air)        Estimated body mass index is 25.58 kg/m  as calculated from the following:    Height as of 11/3/22: 1.702 m (5' 7\").    Weight as of 11/4/22: 74.1 kg (163 lb 4.8 oz).      Wt Readings from Last 2 Encounters:   11/04/22 74.1 kg (163 lb 4.8 oz)   07/07/21 77.6 kg (171 lb)       Gen: Awake, confused, disoriented, relatively calm at the time of my evaluation  Resp: CTA B/L, normal WOB  CVS: RRR, no murmur  Abd/GI: Soft, non-tender. BS- normoactive.   MSK: no pedal edema  Neuro- CN- intact. No focal deficits.        Data   Recent Labs   Lab 10/04/24  1247   WBC 8.0   HGB 14.8   MCV 93         POTASSIUM 4.0   CHLORIDE 108*   CO2 24   BUN 15.4   CR 0.79   ANIONGAP 9   SMILEY 8.1*   GLC 93   ALBUMIN 3.3*   PROTTOTAL 5.6*   BILITOTAL 0.4   ALKPHOS 88   ALT 11   AST 18       Recent Results (from the past 24 hour(s))   XR Chest Port 1 View    Narrative    XR CHEST PORT 1 VIEW 10/8/2024 8:51 AM    HISTORY: cough    COMPARISON: None.      Impression    IMPRESSION: Mild left basilar atelectasis/consolidation and trace left  pleural effusion.  No pneumothorax. Mild cardiomegaly.    GILBERTO DODD MD         SYSTEM ID:  [id number]     Medications   Current Facility-Administered Medications   Medication Dose Route Frequency Provider Last Rate Last Admin     Current Facility-Administered Medications   Medication Dose Route Frequency Provider Last Rate Last Admin    amLODIPine (NORVASC) tablet 5 mg  5 mg Oral Daily Markie Nunez MD   5 mg at 10/08/24 0746    [START ON 10/9/2024] aspirin EC tablet 81 mg  81 mg Oral Weekly Markie Nunez MD        bimatoprost (LUMIGAN) 0.01 % ophthalmic drops 1 drop  1 drop Left Eye At Bedtime Hector Patel MD   1 drop at 10/07/24 2111    dorzolamide-timolol (COSOPT) ophthalmic " solution 1 drop  1 drop Both Eyes BID Hector Patel MD   1 drop at 10/08/24 0747    netarsudil (RHOPRESSA) 0.02 % ophthalmic solution 1 drop  1 drop Right Eye At Bedtime Hector Patel MD   1 drop at 10/07/24 2111    rosuvastatin (CRESTOR) tablet 10 mg  10 mg Oral Daily Markie Nunez MD   10 mg at 10/08/24 0761

## 2024-10-08 NOTE — PROGRESS NOTES
[facility]  Neurology Daily Note      Admission Date:10/5/2024   Date of service: 10/08/2024   Hospital Day: 4                                                   Assessment and Plan:   #.  Dementia  -- I suspect patient has more of a vascular dementia given his history of diabetes with moderate to severe chronic microvascular ischemic changes as seen on MRI in 2018 and CT this admission.  UA negative. He was started on rivastigmine last month, given the initiation and his current decline I do feel would be best to maybe hold his rivastigmine as this could be contributing to his weakness due to lack of appetit  and dehydration.  In review of charts it appears he did have an episode of confusion in July and son reports this is something that has happened twice. So I do feel there that this has been ongoing longer than family or establishment has been aware. Will order MRI brain to evaluate for any subacute-acute stroke to explain his worsening cognitive and strength.    -Continue to hold rivastigmine  -UA negative, recommend evaluation of chest given his current dysphagia and productive cough     Unable to get a hold of daughter today, will try again tomorrow.           Interval History:   Unable to get a hold of daughter, able to discuss with son and daughter-in-law. They do note to times in the last year patient went into the wrong apartment. He was fine to them strength wise a few weeks ago and is more lethargic and weak.          Medications:   Scheduled Meds:  Current Facility-Administered Medications   Medication Dose Route Frequency Provider Last Rate Last Admin    amLODIPine (NORVASC) tablet 5 mg  5 mg Oral Daily Markie Nunez MD   5 mg at 10/08/24 0746    [START ON 10/9/2024] aspirin EC tablet 81 mg  81 mg Oral Weekly Markie Nunez MD        bimatoprost (LUMIGAN) 0.01 % ophthalmic drops 1 drop  1 drop Left Eye At Bedtime Hector Patel MD   1 drop at 10/07/24 1160     dorzolamide-timolol (COSOPT) ophthalmic solution 1 drop  1 drop Both Eyes BID Hector Patel MD   1 drop at 10/08/24 0747    netarsudil (RHOPRESSA) 0.02 % ophthalmic solution 1 drop  1 drop Right Eye At Bedtime Hector Patel MD   1 drop at 10/07/24 2111    rosuvastatin (CRESTOR) tablet 10 mg  10 mg Oral Daily Markie Nunez MD   10 mg at 10/08/24 0746     PRN Meds:   Current Facility-Administered Medications   Medication Dose Route Frequency Provider Last Rate Last Admin    acetaminophen (TYLENOL) tablet 650 mg  650 mg Oral Q4H PRN Hector Patel MD        Or    acetaminophen (TYLENOL) Suppository 650 mg  650 mg Rectal Q4H PRN Hector Patel MD        bisacodyl (DULCOLAX) suppository 10 mg  10 mg Rectal Daily PRN Hector Patel MD        carboxymethylcellulose PF (REFRESH PLUS) 0.5 % ophthalmic solution 1-2 drop  1-2 drop Both Eyes 4x Daily PRN Hector Patel MD        docusate sodium (COLACE) capsule 100 mg  100 mg Oral BID PRN Hector Patel MD        haloperidol lactate (HALDOL) injection 2 mg  2 mg Intravenous Q6H PRN Hector Patel MD   2 mg at 10/07/24 0544    hydrALAZINE (APRESOLINE) injection 10 mg  10 mg Intravenous Q4H PRN Markie Nunez MD        melatonin tablet 3 mg  3 mg Oral At Bedtime PRN Hector Patel MD        ondansetron (ZOFRAN ODT) ODT tab 4 mg  4 mg Oral Q6H PRN Hector Patel MD        Or    ondansetron (ZOFRAN) injection 4 mg  4 mg Intravenous Q6H PRN Hector Patel MD        QUEtiapine (SEROquel) tablet 25 mg  25 mg Oral Q6H PRN Hector Patel MD   25 mg at 10/08/24 0746           Physical Exam:   Vitals: Temp: 98.2  F (36.8  C) Temp src: Axillary BP: 133/66 Pulse: 66   Resp: 18 SpO2: 95 % O2 Device: None (Room air)    Vital Signs with Ranges: Temp:  [97.4  F (36.3  C)-99.2  F (37.3  C)] 98.2  F (36.8  C)  Pulse:  [66-73] 66  Resp:  [16-18] 18  BP: (133-165)/(66-78) 133/66  SpO2:  [95 %]  95 %    General Appearance:  No acute distress  Neuro:           Mental Status Exam: Awakens to sternal rub.  Patient does not currently have his hearing aids in.  Patient does not follow simple commands such as thumbs up or wiggle toes.  Had difficulty understanding patient due to dysarthria.  He is not wearing his hearing aids.   Cranial Nerves: Looks around the room with no gaze palsy.  Pupils equal and reactive. Resists eyelid opening.   Motor: Moves his arms and legs at least antigravity, does not reliably perform for confrontational testing.    Reflexes: Symmetrically intact with absent reflexes at the Achilles and patella. Plantar signs mute.  Sensory: Patient withdrawals to the pinch of his arms and legs bilaterally  Coordination: Unable to test  Gait: Unable to test  Neck: No nuchal rigidity  Extremities: No clubbing, no cyanosis, no edema          Data:   ROUTINE IP LABS (Last 3results)  CBC RESULTS:     Recent Labs   Lab Test 10/04/24  1247 11/03/22  1832   WBC 8.0 9.5   RBC 4.93 5.04   HGB 14.8 15.1   HCT 45.8 46.5    222     Basic Metabolic Panel:  Recent Labs   Lab Test 10/04/24  1247 11/03/22  1832    140   POTASSIUM 4.0 3.8   CHLORIDE 108* 110*   CO2 24 24   BUN 15.4 27   CR 0.79 0.92   GLC 93 106*   SMILEY 8.1* 8.9     Liver panel:  Recent Labs   Lab Test 10/04/24  1247   PROTTOTAL 5.6*   ALBUMIN 3.3*   BILITOTAL 0.4   ALKPHOS 88   AST 18   ALT 11     Thyroid Panel:No lab results found.   Vitamin B12: No lab results found.   Ammonia: No lab results found.     IMAGING:   Independent interpretation of the following studies by myself as part of today's encounter.     CT head 10/4/24:   --IMPRESSION:  Diffuse cerebral volume loss and cerebral white matter  changes consistent with chronic small vessel ischemic disease. No  evidence for acute intracranial pathology.      Radiation dose for this scan was reduced using automated exposure  control, adjustment of the mA and/or kV according to  patient size, or  iterative reconstruction technique.     MRI brain w/wo con 10/4/18  IMPRESSION:    1. No evidence for acute infarct.   2. Moderate chronic microvascular ischemic changes involving the cerebral white matter.   3. Small chronic lacunar infarct involving the left centrum semiovale.   4. Mild volume loss.   5. No abnormal intra-axial enhancement           TIME      45 minutes Evaluation/management time     Doreen Brewster M.D.  Neurologist  [facility]  Office [phone]

## 2024-10-08 NOTE — PROGRESS NOTES
PRIMARY Concern: AMS, worsening agitation, delirium weakness.   SAFETY RISK Concerns (fall risk, behaviors, etc.): fall risk, Aspiration precaution.  Aggression Tool Color: green  Isolation/Type: none  Tests/Procedures for NEXT shift: None  Consults? (Pending/following, signed-off?) Neurology, SW/CC /PT following.  Where is patient from? (Home, TCU, etc.): [place] custodial  Other Important info for NEXT shift:  Pt intermittently somnolent.   Anticipated DC date & active delays: TBD, pending PT rec.   _____________________________________________  SUMMARY NOTE:  Orientation/Cognitive: Confused. Disoriented x4. Keeps taking off his gown, restless this am.   Observation Goals (Met/ Not Met): not met  Mobility Level/Assist Equipment: A2/GB/W.T/R.  Antibiotics & Plan (IV/po, length of tx left): none  Pain Management: No s/sx noted.   Tele/VS/O2: VSS on RA  ABNL Lab/BG: CXR done today d/t coughing, refer to results.   Diet: Full & Thin liquid.Total assist with feeding. Upright position, small sips. Poor appetite.   Bowel/Bladder: Incont, bladder scanned 269ml.   Skin Concerns: Scattered bruising, dry.   Drains/Devices: Pulled out PIV this am.   Patient Stated Goal for Today: SANDRA

## 2024-10-08 NOTE — PROGRESS NOTES
"Care Management Follow Up    Length of Stay (days): 0    Expected Discharge Date: 10/08/2024     Concerns to be Addressed: discharge planning     Patient plan of care discussed at interdisciplinary rounds: Yes    Anticipated Discharge Disposition: Assisted Living              Anticipated Discharge Services:    Anticipated Discharge DME: [facility]    Patient/family educated on Medicare website which has current facility and service quality ratings: other (see comments)  Education Provided on the Discharge Plan: Yes  Patient/Family in Agreement with the Plan: yes    Referrals Placed by CM/SW:    Private pay costs discussed: Not applicable    Discussed  Partnership in Safe Discharge Planning  document with patient/family: No     Handoff Completed: No, handoff not indicated or clinically appropriate    Additional Information:   received a call from sonDaisy that patient's daughter will be home within the hour and will come to the hospital today.   Blackr left a message for [facility] regarding patient coming back. Paitent would need increase in cares and a lift(which is recommended by PT), writer and [facility] staff had discussed this yesterday but not lift.   called back at 3:25 pm and left another message.   received a call from daughter Haley,who has been in [place] without her phone since Thursday, gave an update. She told writer,\"He seems like he really wants to die.\" Blackr went over what was discussed with her brother. She told blackr that [facility] does do Hospice with one agency. She will call [facility] to discuss patient and will get back to us.  During conversation with daughter, [facility] tried to call writer back. Will wait for daughter to call back.   Daughter wondered how she would get him back to [facility].  offered a [employer] transport stretcher ride back if needed. Cost discussed.    Next Steps: follow for discharge planning.    Naty Small RN      "

## 2024-10-08 NOTE — PROGRESS NOTES
Observation goals  PRIOR TO DISCHARGE        Comments: -diagnostic tests and consults completed and resulted- not met  -vital signs normal or at patient baseline- met  -tolerating oral intake to maintain hydration- partially met, poor intake.   -returns to baseline functional status-not met.   -safe disposition plan has been identified- not met. SW/CC following.  Nurse to notify provider when observation goals have been met and patient is ready for discharge.

## 2024-10-08 NOTE — PLAN OF CARE
Goal Outcome Evaluation:      Plan of Care Reviewed With: patient               SUMMARY NOTE:Generalized weakness Acute delirium  Advanced dementia  Orientation/Cognitive: Confused. Disoriented x4. Keeps taking off his gown.   Observation Goals (Met/ Not Met): not met  Mobility Level/Assist Equipment: Ax2 gbw  to BSC   Antibiotics & Plan (IV/po, length of tx left): none  Pain Management: No pain  Tele/VS/O2: VSS on RA  ABNL Lab/BG: N/A  Diet: Full liquid, Thin liquid,Total assist with feeding. Up right position, small sips.   Bowel/Bladder: Inconti at times ,voided in bedside commode  Skin Concerns: Scattered bruising, dry/flaky  Drains/Devices: PIV SL   Patient Stated Goal for Today: SANDRA

## 2024-10-09 NOTE — PROGRESS NOTES
[facility]  Neurology Daily Note      Admission Date:10/5/2024   Date of service: 10/09/2024   Hospital Day: 5                                                   Assessment and Plan:   #.  Dementia  I suspect patient has more of a vascular dementia given his history of diabetes with moderate to severe chronic microvascular ischemic changes as seen on MRI. No stroke to explain his symptoms. UA negative. He was started on rivastigmine last month, given the initiation and his current decline I do feel would be best to maybe hold his rivastigmine as this could be contributing to his weakness due to lack of appetite and dehydration.  In review of charts it appears he did have an episode of confusion in July and son reports this is something that has happened twice. So I do feel there that this has been ongoing longer than family or establishment has been aware. No further workup at this time.   -Continue to hold rivastigmine  -UA negative, recommend evaluation of chest given his current dysphagia and productive cough  -MRI brain negative for acute stroke, shows microvascular disease.     General Neurology service will sign off as no additional neurologic evaluation or management from our service is required at this time.  Please contact General Neurology service if questions related to neurologic status or follow up needed during this hospitalization.          Interval History:   Talked with daughter regarding MRI. She notes this may have been more long standing regarding his memory. She notes they may be going home with hospice.          Medications:   Scheduled Meds:  Current Facility-Administered Medications   Medication Dose Route Frequency Provider Last Rate Last Admin    amLODIPine (NORVASC) tablet 5 mg  5 mg Oral Daily Markie Nunez MD   5 mg at 10/09/24 0847    aspirin EC tablet 81 mg  81 mg Oral Weekly Markie Nunez MD   81 mg at 10/09/24 0847    bimatoprost (LUMIGAN) 0.01 % ophthalmic drops 1  drop  1 drop Left Eye At Bedtime Hector Patel MD   1 drop at 10/08/24 2227    dorzolamide-timolol (COSOPT) ophthalmic solution 1 drop  1 drop Both Eyes BID Hector Patel MD   1 drop at 10/09/24 0848    netarsudil (RHOPRESSA) 0.02 % ophthalmic solution 1 drop  1 drop Right Eye At Bedtime Hector Patel MD   1 drop at 10/08/24 2227    rosuvastatin (CRESTOR) tablet 10 mg  10 mg Oral Daily Markie Nunez MD   10 mg at 10/09/24 0847    sodium chloride (PF) 0.9% PF flush 3 mL  3 mL Intracatheter Q8H Markie Nunez MD   3 mL at 10/09/24 0848     PRN Meds:   Current Facility-Administered Medications   Medication Dose Route Frequency Provider Last Rate Last Admin    acetaminophen (TYLENOL) tablet 650 mg  650 mg Oral Q4H PRN Hector Patel MD        Or    acetaminophen (TYLENOL) Suppository 650 mg  650 mg Rectal Q4H PRN Hector Patel MD        bisacodyl (DULCOLAX) suppository 10 mg  10 mg Rectal Daily PRN Hector Patel MD        carboxymethylcellulose PF (REFRESH PLUS) 0.5 % ophthalmic solution 1-2 drop  1-2 drop Both Eyes 4x Daily PRN Hector Patel MD        docusate sodium (COLACE) capsule 100 mg  100 mg Oral BID PRN Hector Patel MD        haloperidol lactate (HALDOL) injection 2 mg  2 mg Intravenous Q6H PRN Hector Patel MD   2 mg at 10/07/24 0544    hydrALAZINE (APRESOLINE) injection 10 mg  10 mg Intravenous Q4H PRN Markie Nunez MD        melatonin tablet 3 mg  3 mg Oral At Bedtime PRN Hector Patel MD        ondansetron (ZOFRAN ODT) ODT tab 4 mg  4 mg Oral Q6H PRN Hector Patel MD        Or    ondansetron (ZOFRAN) injection 4 mg  4 mg Intravenous Q6H PRN Hector Patel MD        QUEtiapine (SEROquel) tablet 25 mg  25 mg Oral Q6H PRN Hector Patel MD   25 mg at 10/08/24 0746    sodium chloride (PF) 0.9% PF flush 3 mL  3 mL Intracatheter q1 min prn Markie Nunez MD               Physical  Exam:   Vitals: Temp: 98  F (36.7  C) Temp src: Oral BP: 121/55 Pulse: 61   Resp: 18 SpO2: 95 % O2 Device: None (Room air)    Vital Signs with Ranges: Temp:  [97.6  F (36.4  C)-98.9  F (37.2  C)] 98  F (36.7  C)  Pulse:  [61-72] 61  Resp:  [16-18] 18  BP: (121-146)/(55-76) 121/55  SpO2:  [94 %-96 %] 95 %    General Appearance:  No acute distress  Neuro:           Mental Status Exam: Awake and eating. Very hard of hearing.  Patient does not follow simple commands such as thumbs up or wiggle toes.  Does not pantomime.    Cranial Nerves: Looks around the room with no gaze palsy.  Pupils equal and reactive.  Face symmetric at rest and with activation.   Motor: Moves his arms and legs at least antigravity, does not reliably perform for confrontational testing.    Sensory: Not tested  Coordination: Unable to test  Extremities: No clubbing, no cyanosis, no edema          Data:   ROUTINE IP LABS (Last 3results)  CBC RESULTS:     Recent Labs   Lab Test 10/04/24  1247 11/03/22  1832   WBC 8.0 9.5   RBC 4.93 5.04   HGB 14.8 15.1   HCT 45.8 46.5    222     Basic Metabolic Panel:  Recent Labs   Lab Test 10/04/24  1247 11/03/22  1832    140   POTASSIUM 4.0 3.8   CHLORIDE 108* 110*   CO2 24 24   BUN 15.4 27   CR 0.79 0.92   GLC 93 106*   SMILEY 8.1* 8.9     Liver panel:  Recent Labs   Lab Test 10/04/24  1247   PROTTOTAL 5.6*   ALBUMIN 3.3*   BILITOTAL 0.4   ALKPHOS 88   AST 18   ALT 11     Thyroid Panel:No lab results found.   Vitamin B12: No lab results found.   Ammonia: No lab results found.     IMAGING:   Independent interpretation of the following studies by myself as part of today's encounter.   MRI brain w/o con 10/5/2024  FINDINGS:  INTRACRANIAL CONTENTS: No acute or subacute infarct. No mass, acute hemorrhage, or extra-axial fluid collections. Patchy and confluent nonspecific T2/FLAIR hyperintensities within the cerebral white matter most consistent with moderate chronic   microvascular ischemic change. Chronic  lacunar infarcts bilateral corona radiata. Moderate generalized cerebral atrophy. No hydrocephalus. Normal position of the cerebellar tonsils.      SELLA: No abnormality accounting for technique.     OSSEOUS STRUCTURES/SOFT TISSUES: Normal marrow signal. The major intracranial vascular flow voids are maintained.      ORBITS: No abnormality accounting for technique.      SINUSES/MASTOIDS: No paranasal sinus mucosal disease. Scattered fluid/membrane thickening in the right mastoid air cells. No apparent mass in the posterior nasopharynx or skull base.                                                                       IMPRESSION:  1.  No acute findings.  2.  Age-related changes with chronic lacunar infarcts in the bilateral corona radiata.    CT head 10/4/24:   --IMPRESSION:  Diffuse cerebral volume loss and cerebral white matter  changes consistent with chronic small vessel ischemic disease. No  evidence for acute intracranial pathology.      Radiation dose for this scan was reduced using automated exposure  control, adjustment of the mA and/or kV according to patient size, or  iterative reconstruction technique.     MRI brain w/wo con 10/4/18  IMPRESSION:    1. No evidence for acute infarct.   2. Moderate chronic microvascular ischemic changes involving the cerebral white matter.   3. Small chronic lacunar infarct involving the left centrum semiovale.   4. Mild volume loss.   5. No abnormal intra-axial enhancement           TIME     36 minutes Evaluation/management time     Doreen rBewster M.D.  Neurologist  [facility] Neurology  Office [phone]

## 2024-10-09 NOTE — PROGRESS NOTES
[facility]  Hospitalist Progress Note  Jerson Rivera MD  10/09/2024    Assessment & Plan   Suleiman Spain is a markedly pleasant 89 year old gentleman with past medical history that is most notable for advanced dementia, who presents with acute generalized weakness and acute metabolic encephalopathy.     Generalized weakness   Acute delirium  Advanced dementia  -Patient presents with several days of generalized weakness with worsening agitation and delirium.  He was in the emergency room from 10//2024 and initially started care process was initiated, however he was admitted under observation for worsening weakness and delirium.  -On admission he was also found to be dehydrated   -no other reversible causes identified, UA unremarkable, chest x-ray with atelectasis, procalcitonin negative I do not favor this to be a pneumonia.  -Son and patient's long-term care facility felt that this was more of an abrupt decline although it appears like patient had ongoing symptoms of dementia for a while  -General Neurology consulted, they recommended home holding rivastigmine as this could have contributed to his recent decline  -Current plan is for patient to discharge to memory care unit, social work assisting.  I did offer hospice on discussion with the son on 10/6/2024, he would first want to try memory care and see how his dad did before considering hospice.  -family now considering hospice so possible hold on TCU and memory care discharge.     Dysphagia  -Nursing staff have noticed concerns with cough with oral intake  -Patient was evaluated by SLP and they recommend higher level of care given severe cognitive deficit and ongoing dysphagia management.  They recommend full liquid diet with direct staff supervision for feeding assisted.  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 .     Prior CVA  Hypertension  Hyperlipidemia  -Resume prior to admission aspirin, statin  -Resume prior to admission amlodipine     Diet: Full Liquid Diet Thin Liquids (level 0) (Direct staff  supervision, small sips, upright position)     DVT Prophylaxis: Pneumatic Compression Devices   Linder Catheter: Not present  Code Status: No CPR- Do NOT Intubate        Disposition Plan     Expected Discharge Date: 10/10/2024      Destination: to memory care  Discharge Comments: CM/SW  speech rec higher level of care, supervision   pt from [facility] possible return with hospice         Disposition:     Interval History   -- chart reviewed  -- social work notes reviewed    -Data reviewed today: I reviewed all new labs and imaging over the last 24 hours. I personally reviewed no images or EKG's today.    Physical Exam    , Blood pressure 127/60, pulse 64, temperature 97.8  F (36.6  C), temperature source Axillary, resp. rate 18, SpO2 95%.  There were no vitals filed for this visit.  Vital Signs with Ranges  Temp:  [97.8  F (36.6  C)-98.9  F (37.2  C)] 97.8  F (36.6  C)  Pulse:  [61-66] 64  Resp:  [16-18] 18  BP: (115-146)/(55-76) 127/60  SpO2:  [93 %-96 %] 95 %  I/O's Last 24 hours  I/O last 3 completed shifts:  In: 840 [P.O.:840]  Out: 825 [Urine:825]    Constitutional:  Sedated appearing  Respiratory: Clear to auscultation bilaterally, no crackles or wheezing  Cardiovascular: Regular rate and rhythm, normal S1 and S2, and no murmur noted  GI: Normal bowel sounds, soft, non-distended, non-tender  Skin/Integumen: No rashes, no cyanosis, no edema  Other:      Medications   All medications were reviewed.  Current Facility-Administered Medications   Medication Dose Route Frequency Provider Last Rate Last Admin     Current Facility-Administered Medications   Medication Dose Route Frequency Provider Last Rate Last Admin    amLODIPine (NORVASC) tablet 5 mg  5 mg Oral Daily Markie Nunez MD   5 mg at 10/09/24  0847    aspirin EC tablet 81 mg  81 mg Oral Weekly Markie Nunez MD   81 mg at 10/09/24 0847    bimatoprost (LUMIGAN) 0.01 % ophthalmic drops 1 drop  1 drop Left Eye At Bedtime Hector Patel MD   1 drop at 10/08/24 2227    dorzolamide-timolol (COSOPT) ophthalmic solution 1 drop  1 drop Both Eyes BID Hector Patel MD   1 drop at 10/09/24 0848    netarsudil (RHOPRESSA) 0.02 % ophthalmic solution 1 drop  1 drop Right Eye At Bedtime Hector Patel MD   1 drop at 10/08/24 2227    rosuvastatin (CRESTOR) tablet 10 mg  10 mg Oral Daily Markie Nunez MD   10 mg at 10/09/24 0847    sodium chloride (PF) 0.9% PF flush 3 mL  3 mL Intracatheter Q8H Markie Nunez MD   3 mL at 10/09/24 0848        Data   Recent Labs   Lab 10/04/24  1247   WBC 8.0   HGB 14.8   MCV 93         POTASSIUM 4.0   CHLORIDE 108*   CO2 24   BUN 15.4   CR 0.79   ANIONGAP 9   SMILEY 8.1*   GLC 93   ALBUMIN 3.3*   PROTTOTAL 5.6*   BILITOTAL 0.4   ALKPHOS 88   ALT 11   AST 18       Recent Results (from the past 24 hour(s))   MR Brain w/o Contrast    Narrative    EXAM: MR BRAIN W/O CONTRAST  LOCATION: [facility]  DATE: 10/8/2024    INDICATION: weakness, confusion  COMPARISON: Head CT 10/4/2024  TECHNIQUE: Routine multiplanar multisequence head MRI without intravenous contrast.    FINDINGS:  INTRACRANIAL CONTENTS: No acute or subacute infarct. No mass, acute hemorrhage, or extra-axial fluid collections. Patchy and confluent nonspecific T2/FLAIR hyperintensities within the cerebral white matter most consistent with moderate chronic   microvascular ischemic change. Chronic lacunar infarcts bilateral corona radiata. Moderate generalized cerebral atrophy. No hydrocephalus. Normal position of the cerebellar tonsils.     SELLA: No abnormality accounting for technique.    OSSEOUS STRUCTURES/SOFT TISSUES: Normal marrow signal. The major intracranial vascular flow voids are maintained.     ORBITS:  No abnormality accounting for technique.     SINUSES/MASTOIDS: No paranasal sinus mucosal disease. Scattered fluid/membrane thickening in the right mastoid air cells. No apparent mass in the posterior nasopharynx or skull base.       Impression    IMPRESSION:  1.  No acute findings.  2.  Age-related changes with chronic lacunar infarcts in the bilateral corona radiata.         Jerson Rivera MD  Text Page  (7am to 6pm)

## 2024-10-09 NOTE — PROGRESS NOTES
PRIMARY Concern: AMS, worsening agitation, delirium weakness.   SAFETY RISK Concerns (fall risk, behaviors, etc.): fall risk, Aspiration precaution.  Aggression Tool Color: green  Isolation/Type: none  Tests/Procedures for NEXT shift: None  Consults? (Pending/following, signed-off?)Gen  Neurology signed off, SW/CC /PT following.  Where is patient from? (Home, TCU, etc.): [place] [facility]  Other Important info for NEXT shift:Hx Dementia, Dysphagia, Legal blindness- Left, CVA,   Anticipated DC date & active delays: Likely tomorrow back to [place] [facility] with [facility]   ___________________________________________  SUMMARY NOTE:  Orientation/Cognitive:Alert to self only. Calm in the am, Restless this evening. Refusing PRN Seroquel. PRN IV Haldol given x1.   Observation Goals (Met/ Not Met): not met  Mobility Level/Assist Equipment: A2/GB/W.T/R.  Antibiotics & Plan (IV/po, length of tx left): none  Pain Management: No s/sx noted.   Tele/VS/O2: VSS on RA  ABNL Lab/BG: No labs today.   Diet: Puree diet, Thin liquid, No straws.Total assist with feeding. Upright position.  If coughing on thins or wet voice change to mildly thick liquids.   Bowel/Bladder: Incontinent of bladder at times. Bladder scan 256ml asymptomatic. Monitor.   Skin Concerns: Scattered bruising, dry skin.   Drains/Devices: PIV SL.   Patient Stated Goal for Today: SANDRA   Pt's daughter brought in bilat hearing aids today.

## 2024-10-09 NOTE — PROGRESS NOTES
Observation goals  PRIOR TO DISCHARGE       Comments: -  diagnostic tests and consults completed and resulted: Partially met, PT/SLP following.      -vital signs normal or at patient baseline: Met     -tolerating oral intake to maintain hydration: Not met, continues on Full liquid diet d/t coughing.      -returns to baseline functional status: Not met. Needs A2/G/W.      -safe disposition plan has been identified: Not met     Nurse to notify provider when observation goals have been met and patient is ready for discharge.

## 2024-10-09 NOTE — PROGRESS NOTES
diagnostic tests and consults completed and resulted Not met,   -vital signs normal or at patient baseline met  -tolerating oral intake to maintain hydration: not met  -returns to baseline functional status not met, AX2 with lift/ W  -safe disposition plan has been identified not met, TCU vs LTC recommended by therapy pending goals of care, SW following,

## 2024-10-09 NOTE — PROGRESS NOTES
Care Management Follow Up    Length of Stay (days): 0    Expected Discharge Date: 10/10/2024     Concerns to be Addressed: discharge planning     Patient plan of care discussed at interdisciplinary rounds: Yes    Anticipated Discharge Disposition: Assisted Living              Anticipated Discharge Services:    Anticipated Discharge DME: Walker    Patient/family educated on Medicare website which has current facility and service quality ratings: other (see comments)  Education Provided on the Discharge Plan: Yes  Patient/Family in Agreement with the Plan: yes    Referrals Placed by CM/SW:    Private pay costs discussed: Not applicable    Discussed  Partnership in Safe Discharge Planning  document with patient/family: No     Handoff Completed: No, handoff not indicated or clinically appropriate    Additional Information:  Writer received a call from HUMZA Lu  at [facility]. She is wondering about patient and discharge planning. Writer gave update and told Tabitha that our team was waiting to hear fro Daughter, Haley. They ask that writer fax over notes to them at [phone]. Writer faxed over recent notes. They use [facility] at their facility.    Next Steps: follow for discharge planning.    Naty Small RN

## 2024-10-09 NOTE — PLAN OF CARE
Goal Outcome Evaluation:  PRIMARY Concern: AMS, worsening agitation, delirium weakness.   SAFETY RISK Concerns (fall risk, behaviors, etc.): fall risk, Aspiration precaution.  Aggression Tool Color: green  Isolation/Type: none  Tests/Procedures for NEXT shift: MRI brain results pending  Consults? (Pending/following, signed-off?) Neurology following, SW/CC /PT recommending LTC vs TCU pending goals of care  Where is patient from? (Home, TCU, etc.): [place] [facility]  Other Important info for NEXT shift:  Pt intermittently somnolent, [place], pocket talker in use.  Anticipated DC date & active delays: TBD  _____________________________________________  SUMMARY NOTE:  Orientation/Cognitive:  Alert  and oriented to self only Disoriented x 3,   Observation Goals (Met/ Not Met): not met  Mobility Level/Assist Equipment: A2 lift  Antibiotics & Plan (IV/po, length of tx left): none  Pain Management:  Denies and No s/sx noted.   Tele/VS/O2: VSS on RA  ABNL Lab/BG:  See labs  Diet: Full & Thin liquid.Total assist with feeding. Upright position, small sips.  Bowel/Bladder: Voided 150 ml in urinal bladder scanned at 0545 for 196 ml  Skin Concerns: Scattered bruising, dry. Turned and repositioned as needed  Drains/Devices: PIV SL  Patient Stated Goal for Today: SANDRA

## 2024-10-09 NOTE — CONSULTS
Care Management Medical half-way Outpatient Consult    Care management consulted by provider for senior living assessment.  CM spoke with provider to discuss senior living case. Provider has confirmed patient is medically stable for discharge.    Background information: Patient was admitted on 10/5/24 with acute generalized weakness and acute metabolic encephalopathy. Patient resides at [facility].    Care team recommended discharge disposition:  to [facility], transition to memory care    Resources needed for discharge: Possibly hospice referral    Discharge plan secured to meet patient's needs?  Yes    Estimated timeline for discharge:   less than 24 hours    Barriers to discharge: Confirmation on patient's move to memory care, possible plan for hospice and coordinating this with family    Discussed above with provider & charge RN.      Next steps:       Anticipate patient will discharge within the next 24 hours to [facility], possibly hospice referral to be made.     Patient has an established discharge plan that can meet their needs as identified above, as [facility] has agreed to accept patient back at discharge. Family communicating with [facility] directly on transition to memory care possibly. Family communicated interest in hospice to RNCC yesterday, SW left  for family to follow up on this today.    Romina Trimble, CHANA  Social Work  [facility]

## 2024-10-09 NOTE — PROGRESS NOTES
Care Management Follow Up    Length of Stay (days): 0    Expected Discharge Date: 10/10/2024     Concerns to be Addressed: discharge planning     Patient plan of care discussed at interdisciplinary rounds: Yes    Anticipated Discharge Disposition: Assisted Living   With Hospice           Anticipated Discharge Services:    Anticipated Discharge DME: bed, lift, tray table    Patient/family educated on Medicare website which has current facility and service quality ratings: other (see comments)  Education Provided on the Discharge Plan: Yes  Patient/Family in Agreement with the Plan: yes    Referrals Placed by CM/SW:    Private pay costs discussed: Not applicable    Discussed  Partnership in Safe Discharge Planning  document with patient/family: Yes: verbally with family     Handoff Completed: No, handoff not indicated or clinically appropriate    Additional Information:  Writer received a call from daughter, Haley. She tried called  who is on the phone and left her a message. She is calling to let our team know that she does want patient to go back to [facility]ospice with [facility]. Writer let her know that SW sent referral and they accepted patient already. Writer did let Haley know that [facility] staff wants to come out and do an assessment. Out team will coordinate discharge with Hospice.    Next Steps: back to [facility] with [facility]ospice    Naty Small RN

## 2024-10-09 NOTE — PROGRESS NOTES
Care Management Follow Up    Length of Stay (days): 0    Expected Discharge Date: 10/10/2024     Concerns to be Addressed: discharge planning     Patient plan of care discussed at interdisciplinary rounds: Yes    Anticipated Discharge Disposition: Assisted Living       Anticipated Discharge Services:    Anticipated Discharge DME: Walker    Patient/family educated on Medicare website which has current facility and service quality ratings: other (see comments)  Education Provided on the Discharge Plan: Yes  Patient/Family in Agreement with the Plan: yes    Referrals Placed by CM/SW:    Private pay costs discussed: Not applicable    Discussed  Partnership in Safe Discharge Planning  document with patient/family: No     Handoff Completed: No, handoff not indicated or clinically appropriate    Additional Information:  Per bedside RN, family stopped by and are hoping for hospice, annie De Leon left to go talk to [facility] to see if they can accept patient back. Received call from HUMZA Lu at [facility] [phone]. Discussed pt's PT and SLP needs. Tabitha states she is going to review notes RNCC sent over and they likely need to come assess patient. Tabitha confirmed they use [facility]. Tabitha is aware Haley is on her way over and she will talk with family.     Sent referral to [facility].     Addendum 1509: Received call from Sina with [facility] ([phone]). They can accept patient and are going to reach out to annie De Leon. They will follow for anticipated discharge back to [facility]. Spoke with annie De Leon to check in, she is aware of where things are at and plans to start moving some furniture from pt's apartment to allow for hospital bed and other equipment. She still does not have a cell phone and can be reached on home phone number.     CHANA Strong  Social Work  [facility]

## 2024-10-09 NOTE — PROGRESS NOTES
diagnostic tests and consults completed and resulted Not met, MRI pending, neurology following  -vital signs normal or at patient baseline met  -tolerating oral intake to maintain hydration In progress  -returns to baseline functional status not met, AX2 with lift/ W  -safe disposition plan has been identified not met, TCU vs LTC recommended by therapy pending goals of care, SW following,     PRIMARY Concern: AMS, worsening agitation, delirium weakness.   SAFETY RISK Concerns (fall risk, behaviors, etc.): fall risk, Aspiration precaution.  Aggression Tool Color: green  Isolation/Type: none  Tests/Procedures for NEXT shift: MRI brain results pending  Consults? (Pending/following, signed-off?) Neurology following, SW/CC /PT recommending LTC vs TCU pending goals of care  Where is patient from? (Home, TCU, etc.): [facility]  Other Important info for NEXT shift:  Pt intermittently somnolent, [place], pocket talker in use.  Anticipated DC date & active delays: TBD pending goals of care  _____________________________________________  SUMMARY NOTE:  Orientation/Cognitive: Confused. Disoriented x4, only woke up to have supper and has been somnolent since, only waking up to voice and touch ( had received Seroquel 25 mg in the AM)  Observation Goals (Met/ Not Met): not met  Mobility Level/Assist Equipment: A2/GB/W.T/R.  Antibiotics & Plan (IV/po, length of tx left): none  Pain Management: No s/sx noted.   Tele/VS/O2: VSS on RA  ABNL Lab/BG:  please see labs  Diet: Full & Thin liquid.Total assist with feeding. Upright position, small sips.fair apetite   Bowel/Bladder: DTV, straight cathed for 475 with couple clots from traumatic insertion  Skin Concerns: Scattered bruising, dry. Turned and repoed Q 2hrs  Drains/Devices: New PIV Sled  Patient Stated Goal for Today: SANDRA

## 2024-10-09 NOTE — PROGRESS NOTES
Care Management Follow Up    Length of Stay (days): 0    Expected Discharge Date: 10/10/2024     Concerns to be Addressed: discharge planning     Patient plan of care discussed at interdisciplinary rounds: Yes    Anticipated Discharge Disposition: Assisted Living     Anticipated Discharge Services:    Anticipated Discharge DME: [facility]    Patient/family educated on Medicare website which has current facility and service quality ratings: other (see comments)  Education Provided on the Discharge Plan: Yes  Patient/Family in Agreement with the Plan: yes    Referrals Placed by CM/SW:    Private pay costs discussed: Not applicable    Discussed  Partnership in Safe Discharge Planning  document with patient/family: No     Handoff Completed: No, handoff not indicated or clinically appropriate    Additional Information:  Called daughter Haley's home phone, left VM requesting a return call to discuss discharge planning.    CHANA Strong  Social Work  [facility]

## 2024-10-09 NOTE — PLAN OF CARE
Goal Outcome Evaluation:  PRIMARY Concern: AMS, worsening agitation, delirium weakness.   SAFETY RISK Concerns (fall risk, behaviors, etc.): fall risk, Aspiration precaution.  Aggression Tool Color: green  Isolation/Type: none  Tests/Procedures for NEXT shift: MRI brain results pending  Consults? (Pending/following, signed-off?) Neurology following, SW/CC /PT recommending LTC vs TCU pending goals of care  Where is patient from? (Home, TCU, etc.): [facility]  Other Important info for NEXT shift:  Pt intermittently somnolent, [place], pocket talker in use.  Anticipated DC date & active delays: TBD  _____________________________________________  SUMMARY NOTE:  Orientation/Cognitive:  Alert  and oriented to self only Disoriented x 3,   Observation Goals (Met/ Not Met): not met  Mobility Level/Assist Equipment: A2 lift  Antibiotics & Plan (IV/po, length of tx left): none  Pain Management:  Denies and No s/sx noted.   Tele/VS/O2: VSS on RA  ABNL Lab/BG:  See labs  Diet: Full & Thin liquid.Total assist with feeding. Upright position, small sips.  Bowel/Bladder: Voided 150 ml in urinal bladder scanned at 0545 for 196 ml  Skin Concerns: Scattered bruising, dry. Turned and repositioned as needed  Drains/Devices: PIV SL  Patient Stated Goal for Today: SANDRA

## 2024-10-10 NOTE — PROGRESS NOTES
Observation goals  PRIOR TO DISCHARGE       Comments: -  diagnostic tests and consults completed and resulted: Partially met, PT/SLP following.      -vital signs normal or at patient baseline: Met     -tolerating oral intake to maintain hydration: Not met, continues on puree diet d/t coughing.      -returns to baseline functional status: Not met. Needs A2/G/W.      -safe disposition plan has been identified: Not met     Nurse to notify provider when observation goals have been met and patient is ready for discharge.

## 2024-10-10 NOTE — PLAN OF CARE
Goal Outcome Evaluation:  -~Care plan-end of shift note:    ~Orientation/Mentation:A&O to self only, re-oriented, agitated, attempted to climb out bed, gave prn haldol & seroquel this shift with good result.  -~VS: VSS ex SBP 160s, prn hydralazine available for SBP .180s.  on RA sat 90s  -~LS/Pulm:Ls diminished   -~Tele/Cardiac:NA  -~GI/: incontinent  -~Pain:denies  -~Mobility:up w/2/GB/W  -~Skin:dry skin scattered bruises  -~Diet:Pureed  -~New orders /Misc:  -~Safety/Concern: fall risk  -~Aggression color:green/yellow at times   -~Plan/Shift summary/Goals:possible discharge to [facility] with hospice.

## 2024-10-10 NOTE — PROGRESS NOTES
[id number]. A/Ox1, confused, restless & trying to get out of bed. Denies pain. Scattered bruises. Bladder scanned of 425 ml, order parameter not met for str8t cath. No incont episode this shift. Possible d'c tomorrow with hospice care

## 2024-10-10 NOTE — PROGRESS NOTES
Observation goals  PRIOR TO DISCHARGE       Comments: -  diagnostic tests and consults completed and resulted: Partially met, PT/SLP following.      -vital signs normal or at patient baseline: Partially met, slightly elevated BP     -tolerating oral intake to maintain hydration: Not met, continues on puree diet d/t coughing.      -returns to baseline functional status: Not met. Needs A2/G/W.      -safe disposition plan has been identified: Not met     Nurse to notify provider when observation goals have been met and patient is ready for discharge.

## 2024-10-10 NOTE — PROGRESS NOTES
Care Management Follow Up    Length of Stay (days): 0    Expected Discharge Date: 10/11/2024     Concerns to be Addressed: discharge planning     Patient plan of care discussed at interdisciplinary rounds: Yes    Anticipated Discharge Disposition: Assisted Living    Anticipated Discharge Services:    Anticipated Discharge DME: [facility]    Patient/family educated on Medicare website which has current facility and service quality ratings: other (see comments)  Education Provided on the Discharge Plan: Yes  Patient/Family in Agreement with the Plan: yes    Referrals Placed by CM/SW:    Private pay costs discussed: Not applicable    Discussed  Partnership in Safe Discharge Planning  document with patient/family: No     Handoff Completed: No, handoff not indicated or clinically appropriate    Additional Information:  Call to [facility], left  for . Called RN Tabitha, [phone], asked for an update. Tabitha states she has not reviewed the notes yet but will call SW by early afternoon with an update. Explained to Tabitha we are hoping to discharge patient as soon as possible, especially before the weekend coming up.     Addendum: received call from Tabitha explaining they feel pt would do better in a hospice home and do not feel they can meet pt's needs. Requested Tabitha talk with daughter about this, who confirmed she will.  then received call from Sina with [facility], who states that in talks with pt's facility, it was decided that Barbara with [facility] will come out and assess patient tomorrow at 9am to determine potential prognosis, as the [facility] may feel more comfortable accepting patient back if it is on the short-term timeline. Hospitalist updated.     Romina Trimble, CHANA  Social Work  [facility]

## 2024-10-11 NOTE — PROGRESS NOTES
[facility]  Hospitalist Progress Note  Jerson Rivera MD  10/10/2024    Assessment & Plan   Suleiman Spain is a markedly pleasant 89 year old gentleman with past medical history that is most notable for advanced dementia, who presents with acute generalized weakness and acute metabolic encephalopathy.     Generalized weakness   Acute delirium  Advanced dementia  -Patient presents with several days of generalized weakness with worsening agitation and delirium.  He was in the emergency room from 10//2024 and initially started care process was initiated, however he was admitted under observation for worsening weakness and delirium.  -On admission he was also found to be dehydrated   -no other reversible causes identified, UA unremarkable, chest x-ray with atelectasis, procalcitonin negative I do not favor this to be a pneumonia.  -Son and patient's long-term care facility felt that this was more of an abrupt decline although it appears like patient had ongoing symptoms of dementia for a while  -General Neurology consulted, they recommended home holding rivastigmine as this could have contributed to his recent decline  -Current plan is for patient to discharge to memory care unit, social work assisting.  I did offer hospice on discussion with the son on 10/6/2024, he would first want to try memory care and see how his dad did before considering hospice.  -family now considering hospice so possible hold on TCU and memory care discharge.  -hospice will evaluate patient on 10/11 morning at the hospital, will move towards hospice care likely at his current LTC where his spouse is versus new LTC.     Dysphagia  -Nursing staff have noticed concerns with cough with oral intake  -Patient was evaluated by SLP and they recommend higher level of care given severe cognitive deficit and ongoing dysphagia management.  They recommend full liquid diet with direct staff supervision for feeding assisted.   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 .     Prior CVA  Hypertension  Hyperlipidemia  -Resume prior to admission aspirin, statin  -Resume prior to admission amlodipine     Diet: Full Liquid Diet Thin Liquids (level 0) (Direct staff  supervision, small sips, upright position)     DVT Prophylaxis: Pneumatic Compression Devices   Linder Catheter: Not present  Code Status: No CPR- Do NOT Intubate        Disposition Plan     Expected Discharge Date: 10/11/2024      Destination: to memory care  Discharge Comments: CM/RAI  speech rec higher level of care, supervision   pt from [facility] possible return with hospice         Disposition:   -- return to LTC with hospice    Interval History   -- discussed with social work and with RN  -- patient refusing to eat and spitting back up food    -Data reviewed today: I reviewed all new labs and imaging over the last 24 hours. I personally reviewed no images or EKG's today.    Physical Exam    , Blood pressure 120/66, pulse 73, temperature 99.7  F (37.6  C), temperature source Oral, resp. rate 18, SpO2 96%.  There were no vitals filed for this visit.  Vital Signs with Ranges  Temp:  [97.4  F (36.3  C)-99.7  F (37.6  C)] 99.7  F (37.6  C)  Pulse:  [64-83] 73  Resp:  [16-18] 18  BP: (109-162)/(46-87) 120/66  SpO2:  [95 %-96 %] 96 %  I/O's Last 24 hours  I/O last 3 completed shifts:  In: 180 [P.O.:180]  Out: -     Constitutional:  Disheveled, sleeping, ill appearing  Respiratory: Clear to auscultation bilaterally, no crackles or wheezing  Cardiovascular: Regular rate and rhythm, normal S1 and S2, and no murmur noted  GI: Normal bowel sounds, soft, non-distended, non-tender  Skin/Integumen: No rashes, no cyanosis,   Other:      Medications   All medications were reviewed.  Current  Facility-Administered Medications   Medication Dose Route Frequency Provider Last Rate Last Admin     Current Facility-Administered Medications   Medication Dose Route Frequency Provider Last Rate Last Admin    amLODIPine (NORVASC) tablet 5 mg  5 mg Oral Daily Markie Nunez MD   5 mg at 10/10/24 0810    aspirin EC tablet 81 mg  81 mg Oral Weekly Markie Nunez MD   81 mg at 10/09/24 0847    bimatoprost (LUMIGAN) 0.01 % ophthalmic drops 1 drop  1 drop Left Eye At Bedtime Hetcor Patel MD   1 drop at 10/10/24 2142    dorzolamide-timolol (COSOPT) ophthalmic solution 1 drop  1 drop Both Eyes BID Hector Patel MD   1 drop at 10/10/24 2142    netarsudil (RHOPRESSA) 0.02 % ophthalmic solution 1 drop  1 drop Right Eye At Bedtime Hector Patel MD   1 drop at 10/10/24 2143    rosuvastatin (CRESTOR) tablet 10 mg  10 mg Oral Daily Markie Nunez MD   10 mg at 10/10/24 0810    sodium chloride (PF) 0.9% PF flush 3 mL  3 mL Intracatheter Q8H Markie Nunez MD   3 mL at 10/10/24 1723        Data   Recent Labs   Lab 10/04/24  1247   WBC 8.0   HGB 14.8   MCV 93         POTASSIUM 4.0   CHLORIDE 108*   CO2 24   BUN 15.4   CR 0.79   ANIONGAP 9   SMILEY 8.1*   GLC 93   ALBUMIN 3.3*   PROTTOTAL 5.6*   BILITOTAL 0.4   ALKPHOS 88   ALT 11   AST 18       No results found for this or any previous visit (from the past 24 hour(s)).      Jerson Rivera MD  Text Page  (7am to 6pm)

## 2024-10-11 NOTE — PLAN OF CARE
Goal Outcome Evaluation:      Plan of Care Reviewed With: patient    Goal Outcome Evaluation:      Plan of Care Reviewed With: patient  PRIMARY Concern: senior living CARE AMS, Generalized weakness  SAFETY RISK Concerns (fall risk, behaviors, etc.): Fall risk      Aggression Tool Color: Green  Isolation/Type:   Tests/Procedures for NEXT shift:   Consults? (Pending/following, signed-off?) CC/SW  Where is patient from? (Home, TCU, etc.): [place]  Other Important info for NEXT shift: Legally blind in L eye, hx dementia, pt consistently removing gown and brief  Anticipated DC date & active delays: TBD, awaiting hospice placement  _____________________________________________________________________________  SUMMARY NOTE:  Orientation/Cognitive: A&Ox1, self only  Observation Goals (Met/ Not Met): Not met  Mobility Level/Assist Equipment: Total cares, T/R  Antibiotics & Plan (IV/po, length of tx left):   Pain Management: Denies pain  Tele/VS/O2: VSS on RA  ABNL Lab/BG:   Diet: Pureed, thin liquids, no straws. Total assist with feeding. Upright position. If coughing on thins or wet voice change to mildly thick liquids.   Bowel/Bladder: Incontinent of B/B  Skin Concerns: Scattered bruising  Drains/Devices: IV SL  Patient Stated Goal for Today:

## 2024-10-11 NOTE — PROGRESS NOTES
Observation goals  PRIOR TO DISCHARGE        Comments: -diagnostic tests and consults completed and resulted-Not met, still waiting on  PT/OT.   -vital signs normal or at patient baseline- Met.   -tolerating oral intake to maintain hydration-Not met, still on puree diet.   -returns to baseline functional status-Not met, total assist with lift to get up.   -safe disposition plan has been identified-Not met.   Nurse to notify provider when observation goals have been met and patient is ready for discharge.

## 2024-10-11 NOTE — DISCHARGE SUMMARY
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        These results will be followed up by PCP    Discharge Disposition   Discharged to outpatient longterm care  Condition at discharge: Stable    Hospital Course    Suleiman Spain is a markedly pleasant 89 year old gentleman with past medical history that is most notable for advanced dementia, who presents with acute generalized weakness and acute metabolic encephalopathy.     Generalized weakness   Acute delirium  Advanced dementia  -Patient presents with several days of generalized weakness with worsening agitation and delirium.  He was in the emergency room from 10//2024 and initially started care process was initiated, however he was admitted under observation for worsening weakness and delirium.  -On admission he was also found to be dehydrated   -no other reversible causes identified, UA unremarkable, chest x-ray with atelectasis, procalcitonin negative I do not favor this to be a pneumonia.  -Son and patient's long-term care facility felt that this was more of an abrupt  decline although it appears like patient had ongoing symptoms of dementia for a while  -General Neurology consulted, they recommended home holding rivastigmine as this could have contributed to his recent decline  -Current plan is for patient to discharge to memory care unit, social work assisting.  I did offer hospice on discussion with the son on 10/6/2024, he would first want to try memory care and see how his dad did before considering hospice.  -family now considering hospice so possible hold on TCU and memory care discharge.  -hospice will evaluate patient on 10/11 morning at the hospital, will move towards hospice care likely at his current LTC where his spouse is versus new LTC.  - patient had breakfast this AM but did not eat much yesterday and spit food back out.     Dysphagia  -Nursing staff have noticed concerns with cough with oral intake  -Patient was evaluated by SLP and they recommend higher level of care given severe cognitive deficit and ongoing dysphagia management.  They recommend full liquid diet with direct staff supervision for feeding assisted.  They also recommend discontinuing feeding if signs and symptoms of aspiration or respiratory decline occurs  - patient seems to be refusing food and spitting it up back at nursing intermittently, did eat this morning.     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 .     Prior CVA  Hypertension  Hyperlipidemia  -continue aspirin, statin  -continue amlodipine     Diet: Pureed DD diet    DVT Prophylaxis: Pneumatic Compression Devices   Linder Catheter: Not present  Code Status: No CPR- Do NOT Intubate        Disposition Plan     Expected Discharge Date: 10/11/2024      Destination: to memory care  Discharge Comments: CM/RAI  speech rec higher level of care, supervision   pt from [place] memory care possible return with hospice          Disposition:   -- return to LTC with hospice    Consultations This Hospital Stay   CARE MANAGEMENT / SOCIAL WORK IP  CONSULT  PHARMACY IP CONSULT    Code Status   No CPR- Do NOT Intubate    Time Spent on this Encounter   I, Jerson Rivera MD, personally saw the patient today and spent greater than 30 minutes discharging this patient.       Jerson Rivera MD  [facility]TEND[facility] RECOV[facility] SHORT STAY  [address] [address]  Phone: [phone]  ______________________________________________________________________    Physical Exam   Vital Signs:                    Weight: 0 lbs 0 oz         Primary Care Physician   Irvin Oliva    Discharge Orders   No discharge procedures on file.    Significant Results and Procedures   Most Recent 3 CBC's:  Recent Labs   Lab Test 10/04/24  1247 11/03/22  1832   WBC 8.0 9.5   HGB 14.8 15.1   MCV 93 92    222     Most Recent 3 BMP's:  Recent Labs   Lab Test 10/04/24  1247 11/03/22  1832    140   POTASSIUM 4.0 3.8   CHLORIDE 108* 110*   CO2 24 24   BUN 15.4 27   CR 0.79 0.92   ANIONGAP 9 6   SMILEY 8.1* 8.9   GLC 93 106*   ,   Results for orders placed or performed during the hospital encounter of 10/05/24   XR Chest Port 1 View    Narrative    XR CHEST PORT 1 VIEW 10/8/2024 8:51 AM    HISTORY: cough    COMPARISON: None.      Impression    IMPRESSION: Mild left basilar atelectasis/consolidation and trace left  pleural effusion.  No pneumothorax. Mild cardiomegaly.    GILBERTO DODD MD         SYSTEM ID:  [id number]   MR Brain w/o Contrast    Narrative    EXAM: MR BRAIN W/O CONTRAST  LOCATION: [facility]  DATE: 10/8/2024    INDICATION: weakness, confusion  COMPARISON: Head CT 10/4/2024  TECHNIQUE: Routine multiplanar multisequence head MRI without intravenous contrast.    FINDINGS:  INTRACRANIAL CONTENTS: No acute or subacute infarct. No mass, acute hemorrhage, or extra-axial fluid collections. Patchy and confluent nonspecific T2/FLAIR hyperintensities within the cerebral white matter most consistent with moderate  chronic   microvascular ischemic change. Chronic lacunar infarcts bilateral corona radiata. Moderate generalized cerebral atrophy. No hydrocephalus. Normal position of the cerebellar tonsils.     SELLA: No abnormality accounting for technique.    OSSEOUS STRUCTURES/SOFT TISSUES: Normal marrow signal. The major intracranial vascular flow voids are maintained.     ORBITS: No abnormality accounting for technique.     SINUSES/MASTOIDS: No paranasal sinus mucosal disease. Scattered fluid/membrane thickening in the right mastoid air cells. No apparent mass in the posterior nasopharynx or skull base.       Impression    IMPRESSION:  1.  No acute findings.  2.  Age-related changes with chronic lacunar infarcts in the bilateral corona radiata.         Discharge Medications   Current Discharge Medication List        CONTINUE these medications which have NOT CHANGED    Details   acetaminophen (TYLENOL) 500 MG tablet Take 2 tablets (1,000 mg) by mouth every 6 hours as needed for pain    Associated Diagnoses: Hip pain, left      amLODIPine (NORVASC) 5 MG tablet Take 5 mg by mouth daily      aspirin 81 MG EC tablet Take 81 mg by mouth. ON WEDNESDAYS      bimatoprost (LUMIGAN) 0.01 % SOLN Place 1 drop Into the left eye At Bedtime      calcium carbonate (TUMS) 500 MG chewable tablet Take 2 chew tab by mouth every 6 hours as needed for heartburn.      carboxymethylcellulose PF (REFRESH PLUS) 0.5 % ophthalmic solution Place 1-2 drops into both eyes 4 times daily as needed for dry eyes.      dorzolamide-timolol (COSOPT) 2-0.5 % ophthalmic solution Place 1 drop into both eyes 2 times daily      Multiple Vitamins-Minerals (MULTIVITAMIN MEN 50+) TABS Take 1 tablet by mouth daily.      netarsudil (RHOPRESSA) 0.02 % ophthalmic solution Place 1 drop into the right eye At Bedtime      QUEtiapine (SEROQUEL) 25 MG tablet Take 25 mg by mouth every 6 hours as needed (AGITATION).      rivastigmine (EXELON) 1.5 MG capsule Take 1.5 mg by mouth 2  times daily.      rosuvastatin (CRESTOR) 10 MG tablet Take 10 mg by mouth daily      Vitamin D, Cholecalciferol, 25 MCG (1000 UT) CAPS Take 1 capsule by mouth daily.           Allergies   Allergies   Allergen Reactions    Iodinated Contrast Media Hives     DYE  DYE      Aspirin Buffered Unknown and GI Disturbance    Brimonidine Other (See Comments)     stomach ache, head ache, sore muscles  Eye burning      Fd&C Blue #2 (Indigotine) Hives    Ibuprofen Unknown     Stomach ulcers and bleeding      Aspirin GI Disturbance and Itching     Gi bleed; not an allergy  Okay for low dose asa 81mg M W F  Gi bleed; not an allergy  Okay for low dose asa 81mg M W F      Erythromycin Rash    Penicillins Rash

## 2024-10-11 NOTE — PLAN OF CARE
Goal Outcome Evaluation:      Observation goals  PRIOR TO DISCHARGE       Comments: -  diagnostic tests and consults completed and resulted: Partially met, PT/SLP following.      -vital signs normal or at patient baseline: Met     -tolerating oral intake to maintain hydration: Not met, continues on puree diet d/t coughing.      -returns to baseline functional status: Not met. Needs A2/G/W.      -safe disposition plan has been identified: Not met     Nurse to notify provider when observation goals have been met and patient is ready for discharge.

## 2024-10-11 NOTE — PROGRESS NOTES
"Care Management Follow Up    Length of Stay (days): 0    Expected Discharge Date: 10/11/2024     Concerns to be Addressed: discharge planning     Patient plan of care discussed at interdisciplinary rounds: Yes    Anticipated Discharge Disposition: Assisted Living     Anticipated Discharge Services:    Anticipated Discharge DME: Walker    Patient/family educated on Medicare website which has current facility and service quality ratings: other (see comments)  Education Provided on the Discharge Plan: Yes  Patient/Family in Agreement with the Plan: yes    Referrals Placed by CM/SW:    Private pay costs discussed: Not applicable    Discussed  Partnership in Safe Discharge Planning  document with patient/family: No     Handoff Completed: No, handoff not indicated or clinically appropriate    Additional Information:  Spoke with RN Courtney from [facility]. They cannot have pt return there on hospice unless family want to set up private pay home care to ensure there is enough supervision. Courtney has discussed this with [facility] after the assessment this morning and [facility] is in agreement. Courtney states they do not trust/feel family can provide this level of supervision themselves, state daughter is on vacation often and son does not assist with patient. Courtney can be reached at [phone]. Attempted to speak with daughter in room, daughter not there. Called daughter's phone, another individual answered and said Keila is at the hospital right now. Requested bedside RN let SW know when daughter is back in room.    Sent message to VA liaison Doreen Rubio to check on pt's service connection in case LTC with hospice is an option. \"He is currently not enrolled in VA health care.  It looks like he tried to apply a few years ago but was likely over income. If he is going to enroll in hospice, he can file for catestrophic disability which will eliminate that income requirement.  (Please see attached) . He should " "also fill out a new VA form [id number] ( you can find that if you google it).    Please fax to [phone]\".    Addendum 1554: Received call from Haley from home phone requesting a call. Called back twice, no answer. Received call from [facility] wondering why [facility] has not connected with daughter yet. Explained that without Haley having a cell phone, [facility] has not been able to connect with Haley by home phone or in person today.     Addendum 1618: Spoke with daughter Haley, who states she would like to hire private pay care for patient to return to [facility] with hospice, as she feels it's important for pt and spouse to be together. Haley feels \"pushed out\" from [facility] right now, and feels the [facility] markets that they do hospice care when they really don't. E-mailed list of agencies to [email or web address].    CHANA Strong  Social Work  [facility]       "

## 2024-10-11 NOTE — PROGRESS NOTES
Observation goals  PRIOR TO DISCHARGE          -diagnostic tests and consults completed and resulted-Not met.   -vital signs normal or at patient baseline- Met.   -tolerating oral intake to maintain hydration-Not met, still on puree diet.   -returns to baseline functional status-Not met, total assist with lift to get up.   -safe disposition plan has been identified-Not met.     Nurse to notify provider when observation goals have been met and patient is ready for discharge.

## 2024-10-11 NOTE — PLAN OF CARE
"Physical Therapy Discharge Summary    Reason for therapy discharge:    Discharged to home.    Progress towards therapy goal(s). See goals on Care Plan in [facility] electronic health record for goal details.  Goals not met.  Barriers to achieving goals:   limited tolerance for therapy and discharge from facility.    Therapy recommendation(s):    Continued therapy is recommended.  Rationale/Recommendations:  Per prior PT notes, \"Pt appears to be below baseline. Unable to determine PLOF as pt is alert to self. Pt currently requiring max A x 2 for OOB activity, unable to safely attempt ambulation. Pt would benefit from continued skilled PT services via TCU to address deficits and improve IND with safety and functional mobility. If pt were to return to shelter, would require lift for transfers. In addition, pt would likely require higher level of care such as memory care [facility] after TCU stay..\"  Of note, patient ultimately transitioned to hospice cares, therefore further PT not indicated at time of discharge.    Patient not seen by this therapist on this date, discharge summary written based on chart review and previous PT notes. Please see PT flowsheets for further details.        "

## 2024-10-11 NOTE — PROGRESS NOTES
PRIMARY Concern: AMS, worsening agitation, delirium weakness.   SAFETY RISK Concerns (fall risk, behaviors, etc.): fall risk, Aspiration precaution.  Aggression Tool Color: green  Isolation/Type: none  Tests/Procedures for NEXT shift: None  Consults? (Pending/following, signed-off?)Gen  Neurology signed off, SW/CC /PT following.  Where is patient from? (Home, TCU, etc.): [place] [facility]  Other Important info for NEXT shift:Hx Dementia, Dysphagia, Legal blindness- Left, CVA,   Anticipated DC date & active delays: TBD  ___________________________________________  SUMMARY NOTE: Assessment done by coordinator form [facility], no words from them yet. Patient mostly asleep this morning, occasionally awake and restless but easy to redirect.   Orientation/Cognitive: Oriented to self only.   Observation Goals (Met/ Not Met): not met  Mobility Level/Assist Equipment: A2/GB/W.T/R.  Antibiotics & Plan (IV/po, length of tx left): none  Pain Management: No s/sx noted.   Tele/VS/O2: VSS on RA  ABNL Lab/BG: No labs today.   Diet: Puree diet, Thin liquid, No straws.Total assist with feeding. Upright position.  If coughing on thins or wet voice change to mildly thick liquids.   Bowel/Bladder: Incontinent of bladder.   Skin Concerns: Scattered bruising, dry skin.   Drains/Devices: PIV SL.   Patient Stated Goal for Today: SANDRA   Pt's daughter brought in bilat hearing aids today.

## 2024-10-11 NOTE — PROGRESS NOTES
[facility]  Hospitalist Progress Note  Jerson Rivera MD  10/11/2024    Assessment & Plan   Suleiman Spain is a markedly pleasant 89 year old gentleman with past medical history that is most notable for advanced dementia, who presents with acute generalized weakness and acute metabolic encephalopathy.     Generalized weakness   Acute delirium  Advanced dementia  -Patient presents with several days of generalized weakness with worsening agitation and delirium.  He was in the emergency room from 10//2024 and initially started care process was initiated, however he was admitted under observation for worsening weakness and delirium.  -On admission he was also found to be dehydrated   -no other reversible causes identified, UA unremarkable, chest x-ray with atelectasis, procalcitonin negative I do not favor this to be a pneumonia.  -Son and patient's long-term care facility felt that this was more of an abrupt decline although it appears like patient had ongoing symptoms of dementia for a while  -General Neurology consulted, they recommended home holding rivastigmine as this could have contributed to his recent decline  -Current plan is for patient to discharge to memory care unit, social work assisting.  I did offer hospice on discussion with the son on 10/6/2024, he would first want to try memory care and see how his dad did before considering hospice.  -family now considering hospice so possible hold on TCU and memory care discharge.  -hospice will evaluate patient on 10/11 morning at the hospital, will move towards hospice care likely at his current LTC where his spouse is versus new LTC.  - patient had breakfast this AM but did not eat much yesterday and spit food back out.     Dysphagia  -Nursing staff have noticed concerns with cough with oral intake  -Patient was evaluated by SLP and they recommend higher level of care given severe cognitive deficit and ongoing dysphagia management.   They recommend full liquid diet with direct staff supervision for feeding assisted.  They also recommend discontinuing feeding if signs and symptoms of aspiration or respiratory decline occurs  - patient seems to be refusing food and spitting it up back at nursing intermittently, did eat this morning.     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 .     Prior CVA  Hypertension  Hyperlipidemia  -continue aspirin, statin  -continue amlodipine     Diet: Pureed DD diet    DVT Prophylaxis: Pneumatic Compression Devices   Linder Catheter: Not present  Code Status: No CPR- Do NOT Intubate        Disposition Plan     Expected Discharge Date: 10/11/2024      Destination: to memory care  Discharge Comments: NANCY/RAI  speech rec higher level of care, supervision   pt from [facility] possible return with hospice         Disposition:   -- return to LTC with hospice    Interval History   -- discussed with RN  -- SW notes reviewed, LTC staff unable to give him the care he needs  -- noted family not always present  -- ate breakfast today    -Data reviewed today: I reviewed all new labs and imaging over the last 24 hours. I personally reviewed no images or EKG's today.    Physical Exam    , Blood pressure 127/75, pulse 80, temperature 98  F (36.7  C), temperature source Oral, resp. rate 18, SpO2 92%.  There were no vitals filed for this visit.  Vital Signs with Ranges  Temp:  [98  F (36.7  C)-99.7  F (37.6  C)] 98  F (36.7  C)  Pulse:  [64-80] 80  Resp:  [16-18] 18  BP: (109-127)/(46-75) 127/75  SpO2:  [92 %-96 %] 92 %  I/O's Last 24 hours  I/O last 3 completed shifts:  In: 180 [P.O.:180]  Out: -     Constitutional:  Sleeping but arouses, bilateral hearing aids  Respiratory: Clear to auscultation bilaterally, no crackles or wheezing  Cardiovascular: Regular rate and rhythm, normal S1 and S2, and no murmur noted  GI: Normal bowel sounds, soft, non-distended, non-tender  Skin/Integumen: No rashes, no cyanosis,  cachexia  Other:      Medications   All medications were reviewed.  Current Facility-Administered Medications   Medication Dose Route Frequency Provider Last Rate Last Admin     Current Facility-Administered Medications   Medication Dose Route Frequency Provider Last Rate Last Admin    amLODIPine (NORVASC) tablet 5 mg  5 mg Oral Daily Markie Nunez MD   5 mg at 10/11/24 0901    aspirin EC tablet 81 mg  81 mg Oral Weekly Markie Nunez MD   81 mg at 10/09/24 0847    bimatoprost (LUMIGAN) 0.01 % ophthalmic drops 1 drop  1 drop Left Eye At Bedtime Hector Patel MD   1 drop at 10/10/24 2142    dorzolamide-timolol (COSOPT) ophthalmic solution 1 drop  1 drop Both Eyes BID Hector Patel MD   1 drop at 10/11/24 0901    netarsudil (RHOPRESSA) 0.02 % ophthalmic solution 1 drop  1 drop Right Eye At Bedtime Hector Patel MD   1 drop at 10/10/24 2143    rosuvastatin (CRESTOR) tablet 10 mg  10 mg Oral Daily Markie Nunez MD   10 mg at 10/11/24 0900    sodium chloride (PF) 0.9% PF flush 3 mL  3 mL Intracatheter Q8H Markie Nunez MD   3 mL at 10/11/24 0908        Data   Recent Labs   Lab 10/04/24  1247   WBC 8.0   HGB 14.8   MCV 93         POTASSIUM 4.0   CHLORIDE 108*   CO2 24   BUN 15.4   CR 0.79   ANIONGAP 9   SMILEY 8.1*   GLC 93   ALBUMIN 3.3*   PROTTOTAL 5.6*   BILITOTAL 0.4   ALKPHOS 88   ALT 11   AST 18       No results found for this or any previous visit (from the past 24 hour(s)).      Jerson Rivera MD  Text Page  (7am to 6pm)

## 2024-10-11 NOTE — PLAN OF CARE
Goal Outcome Evaluation:      Plan of Care Reviewed With: patient  PRIMARY Concern: AMS, Generalized weakness  SAFETY RISK Concerns (fall risk, behaviors, etc.): Fall risk      Aggression Tool Color: Green  Isolation/Type:   Tests/Procedures for NEXT shift:   Consults? (Pending/following, signed-off?) CC/SW  Where is patient from? (Home, TCU, etc.): [place] [facility]  Other Important info for NEXT shift: Legally blind in L eye, hx dementia  Anticipated DC date & active delays: TBD, awaiting hospice placement  _____________________________________________________________________________  SUMMARY NOTE:  Orientation/Cognitive: A&Ox1, self only  Observation Goals (Met/ Not Met): Not met  Mobility Level/Assist Equipment: Total cares, T/R  Antibiotics & Plan (IV/po, length of tx left):   Pain Management: Denies pain  Tele/VS/O2: VSS on RA  ABNL Lab/BG:   Diet: Pureed, thin liquids, no straws. Total assist with feeding. Upright position. If coughing on thins or wet voice change to mildly thick liquids.   Bowel/Bladder: Incontinent of B/B  Skin Concerns: Scattered bruising  Drains/Devices: IV SL  Patient Stated Goal for Today:

## 2024-10-11 NOTE — PROGRESS NOTES
PRIMARY Concern: AMS, worsening agitation, delirium weakness.   SAFETY RISK Concerns (fall risk, behaviors, etc.): fall risk, Aspiration precaution.  Aggression Tool Color: green  Isolation/Type: none  Tests/Procedures for NEXT shift: None  Consults? (Pending/following, signed-off?)Gen  Neurology signed off, SW/CC /PT following.  Where is patient from? (Home, TCU, etc.): [place] [facility]  Other Important info for NEXT shift:Hx Dementia, Dysphagia, Legal blindness- Left, CVA,   Anticipated DC date & active delays: Likely tomorrow after [facility] assessment at 9am  ___________________________________________  SUMMARY NOTE:  Orientation/Cognitive:Alert to self .Restless.Seroquel this am and PRN IV Haldol given x1 this evening as the pt was not able to take po meds- spitting. .   Observation Goals (Met/ Not Met): not met  Mobility Level/Assist Equipment: A2/GB/W.T/R.  Antibiotics & Plan (IV/po, length of tx left): none  Pain Management: No s/sx noted.   Tele/VS/O2: VSS on RA  ABNL Lab/BG: No labs today.   Diet: Puree diet, Thin liquid, No straws.Total assist with feeding. Upright position.  If coughing on thins or wet voice change to mildly thick liquids. No food today, just drinks- Refusing to eat. Daughter updated about pt's declining condition.   Bowel/Bladder: Incontinent of bladder. PVR  208.Monitor.   Skin Concerns: Scattered bruising, dry skin.   Drains/Devices: PIV SL.   Patient Stated Goal for Today: SANDRA   Pt's wearing bilat hearing aids.

## 2024-10-12 NOTE — PLAN OF CARE
PRIMARY Concern: AMS, Generalized weakness  SAFETY RISK Concerns (fall risk, behaviors, etc.): Fall risk      Aggression Tool Color: Green  Isolation/Type: NA  Tests/Procedures for NEXT shift: NA  Consults? (Pending/following, signed-off?) CC/SW  Where is patient from? (Home, TCU, etc.): [place] assisted, cannot return  Other Important info for NEXT shift: Legally blind in L eye, [place]/ uses hearing aids. Hx of dementia  Anticipated DC date & active delays: TBD, awaiting hospice placement  _____________________________________________________________________________  SUMMARY NOTE:  Orientation/Cognitive: A&O to self only. Mostly calm and cooperative but occasionally restless and yells out loud. Easily reassured.  Observation Goals (Met/ Not Met): senior care  Mobility Level/Assist Equipment: Total cares, T/R  Antibiotics & Plan (IV/po, length of tx left): NA  Pain Management: no s/sx of pain noted.   Tele/VS/O2: VSS on RA  ABNL Lab/BG: NA  Diet: Pureed, thin liquids, no straws. Total assist with feeding. Appetite poor to fair. Needs upright position with PO intake . If coughing on thins or wet voice change to mildly thick liquids. Meds crushed with apple sauce ( Exelon capsule should not be opened; however, pt able to swallow it this shift with apple sauce. If pt is having difficulty with it, please inform pharmacy).   Bowel/Bladder: Incontinent of B/B. At times requests for urinal and voids only in very small amount. Bladder scanned/ no retention noted.   Skin Concerns: Scattered bruising. Scab and blanchable redness on R elbow. Mepilex applied on bilateral elbows.   Drains/Devices: no IV  Patient Stated Goal for Today: SNADRA

## 2024-10-12 NOTE — PROVIDER NOTIFICATION
MD Notification    Notified Person: MD    Notified Person Name: Dr Patel    Notification Date/Time: 10/12/2024 0207    Notification Interaction: Talked with MD    Purpose of Notification: Pt pulled out 2nd iv today. He is residential, no iv meds, not on tele, has dementia and dnr/dni. Does he need a new IV or cn he go without?     Orders Received: He should not need an IV if he is residential care status    Comments:

## 2024-10-12 NOTE — PLAN OF CARE
PRIMARY Concern: AMS, Generalized weakness  SAFETY RISK Concerns (fall risk, behaviors, etc.): Fall risk      Aggression Tool Color: Green  Isolation/Type: NA  Tests/Procedures for NEXT shift: NA  Consults? (Pending/following, signed-off?) CC/SW  Where is patient from? (Home, TCU, etc.): [place] longterm, cannot return  Other Important info for NEXT shift: Legally blind in L eye, hx dementia  Anticipated DC date & active delays: TBD, awaiting hospice placement  _____________________________________________________________________________  SUMMARY NOTE:  Orientation/Cognitive: A&Ox1, self only  Observation Goals (Met/ Not Met): MCC  Mobility Level/Assist Equipment: Total cares, T/R  Antibiotics & Plan (IV/po, length of tx left): NA  Pain Management: Denies pain  Tele/VS/O2: VSS on RA  ABNL Lab/BG: NA  Diet: Pureed, thin liquids, no straws. Total assist with feeding. Upright position. If coughing on thins or wet voice change to mildly thick liquids.   Bowel/Bladder: Incontinent of B/B  Skin Concerns: Scattered bruising  Drains/Devices: no IV  Patient Stated Goal for Today:   Goal Outcome Evaluation:

## 2024-10-12 NOTE — PHARMACY-ADMISSION MEDICATION HISTORY
Admission medication history completed at [facility]. Please see Pharmacy Intern Admission Medication History note from 10/05/2024.    Sanjay Hilton RPH

## 2024-10-13 NOTE — PROGRESS NOTES
Pt has hearing aids in place in left ear and the part of the R hearing aids is missing. Notified the charge nurse NAHOMI.

## 2024-10-13 NOTE — PLAN OF CARE
PRIMARY Concern: AMS, Generalized weakness  SAFETY RISK Concerns (fall risk, behaviors, etc.): Fall risk      Aggression Tool Color: Green  Isolation/Type: NA  Tests/Procedures for NEXT shift: NA  Consults? (Pending/following, signed-off?) CC/SW  Where is patient from? (Home, TCU, etc.): [place] [facility], cannot return  Other Important info for NEXT shift: Legally blind in L eye, [place]/ uses hearing aids. Hx of dementia  Anticipated DC date & active delays: TBD, awaiting hospice placement  _____________________________________________________________________________  SUMMARY NOTE:  Orientation/Cognitive: A&O to self only. Mostly calm and cooperative but occasionally restless and yells out loud. Easily reassured.  Observation Goals (Met/ Not Met): FDC  Mobility Level/Assist Equipment: Total cares, T/R  Antibiotics & Plan (IV/po, length of tx left): NA  Pain Management: no s/sx of pain noted.   Tele/VS/O2: VSS on RA  ABNL Lab/BG: NA  Diet: Pureed, thin liquids, no straws. Total assist with feeding. Appetite poor to fair. Needs upright position with PO intake . If coughing on thins or wet voice change to mildly thick liquids. Meds crushed with apple sauce ( Exelon capsule should not be opened).   Bowel/Bladder: Incontinent of B/B. At times requests for urinal and voids only in very small amount. Bladder scanned/ no retention noted.   Skin Concerns: Scattered bruising. Scab and blanchable redness on R elbow. Mepilex applied on bilateral elbows.   Drains/Devices: no IV  Patient Stated Goal for Today: SANDRA

## 2024-10-13 NOTE — CONSULTS
Care Management Initial Consult    Gene  Reason for Consult Discharge Planning/Disposition    Reason for Consult Other (explain in comments)    Reason MEDICAL alf OUTPATIENT Discharge Planning        10/11/24 1626   [facility] Information  Assessment completed with: chart review       Primary Care Provider verified and updated as needed:     Readmission within the last 30 days:           Advance Care Planning:            Communication Assessment  Patient's communication style:               Cognitive  Cognitive/Neuro/Behavioral: .WDL except, orientation  Level of Consciousness: confused  Arousal Level: arouses to touch/gentle shaking  Orientation: disoriented to, place, time, situation  Mood/Behavior: restless  Best Language: 0 - No aphasia  Speech: garbled, illogical    Living Environment:   People in home:     [facility]  Current living Arrangements:        Able to return to prior arrangements:  yes once private duty home care arranged       Family/Social Support:  Care provided by:    Provides care for:       Support system:            Description of Support System:           Current Resources:   Patient receiving home care services:          Community Resources:    Equipment currently used at home:    Supplies currently used at home:      Employment/Financial:  Employment Status:          Financial Concerns:             Does the patient's insurance plan have a 3 day qualifying hospital stay waiver?  No    Lifestyle & Psychosocial Needs:  Social Determinants of Health     Food Insecurity: Unknown (10/6/2024)    Food Insecurity     Within the past 12 months, did you worry that your food would run out before you got money to buy more?: Patient unable to answer     Within the past 12 months, did the food you bought just not last and you didn t have money to get more?: Patient unable to answer   Depression: Not at risk (9/19/2023)    Received from [facility]    PHQ-2     PHQ-2 Score: 0   Housing Stability: Unknown  (10/6/2024)    Housing Stability     Do you have housing? : Patient unable to answer     Are you worried about losing your housing?: Patient unable to answer   Tobacco Use: Medium Risk (9/19/2024)    Received from [facility]    Patient History     Smoking Tobacco Use: Former     Smokeless Tobacco Use: Never     Passive Exposure: Not on file   Financial Resource Strain: Unknown (10/6/2024)    Financial Resource Strain     Within the past 12 months, have you or your family members you live with been unable to get utilities (heat, electricity) when it was really needed?: Patient unable to answer   Alcohol Use: Not on file   Transportation Needs: Unknown (10/6/2024)    Transportation Needs     Within the past 12 months, has lack of transportation kept you from medical appointments, getting your medicines, non-medical meetings or appointments, work, or from getting things that you need?: Patient unable to answer   Physical Activity: Not on file   Interpersonal Safety: Not on file   Stress: Not on file   Social Connections: Not on file   Health Literacy: Not on file       Functional Status:  Prior to admission patient needed assistance:              Mental Health Status:          Chemical Dependency Status:                Values/Beliefs:  Spiritual, Cultural Beliefs, Synagogue Practices, Values that affect care:                 Discussed  Partnership in Safe Discharge Planning  document with patient/family: No    Additional Information:  Patient's admission status has changed to longterm.    Short Stay Romina ATWOOD,  has been working with this patient's annie Wong and [facility] facility in regards to patient's discharge plan.  Please see previous chart for history.  Current plan is for patient to return to [facility] with enrollment into [facility] and private duty care.  On Friday 10/11, Romina gave annie Wong a list of private duty home care agencies to contact and make necessary arrangements.  Today  writer contacted Marvin at both her home and cell number. There was no answer. Patient was able to leave a message on the cell voice mail, but not on the land line.    Next Steps:   Monday SW will follow up with daughter. Private duty home care needs to be arranged before [place] will accept patient back.   Norma Vila, DANIELSW

## 2024-10-14 NOTE — PLAN OF CARE
PRIMARY Concern: AMS, Generalized weakness  SAFETY RISK Concerns (fall risk, behaviors, etc.): Fall risk      Aggression Tool Color: Green  Isolation/Type: NA  Tests/Procedures for NEXT shift: NA  Consults? (Pending/following, signed-off?) CC/SW  Where is patient from? (Home, TCU, etc.): [facility]  Other Important info for NEXT shift: Legally blind in L eye, [place]/ uses hearing aids. Hx of dementia. Plan is to return back to [facility] with enrollment into [facility] and private duty care.   Anticipated DC date & active delays: TBD, awaiting hospice placement. SW following.  _______________________________________________________________________  SUMMARY NOTE:  Orientation/Cognitive: A&O to self only. Mostly calm and cooperative but occasionally restless and keeps taking off the gown.   Observation Goals (Met/ Not Met): intermediate  Mobility Level/Assist Equipment: Total cares, T/R  Antibiotics & Plan (IV/po, length of tx left): NA  Pain Management: no s/sx of pain noted.   Tele/VS/O2: VSS on RA  ABNL Lab/BG: NA  Diet: Pureed, thin liquids, no straws. Total assist with feeding. Appetite poor. Needs upright position with PO intake . If coughing on thins or wet voice change to mildly thick liquids. Meds crushed with apple sauce ( Exelon capsule should not be opened).   Bowel/Bladder: Incontinent of B/B. At times requests for urinal and voids only in very small amount. Incontinent bladder X3. BM this shift.   Skin Concerns: Scattered bruising. Scab and blanchable redness on R elbow. Mepilex applied on bilateral elbows.   Drains/Devices: no IV  Patient Stated Goal for Today: SANDRA

## 2024-10-14 NOTE — PLAN OF CARE
10/13/2024 [id number]  PRIMARY Concern: Gen weakness  SAFETY RISK Concerns (fall risk, behaviors, etc.): Fall risk      Aggression Tool Color: Green  Isolation/Type: NA  Tests/Procedures for NEXT shift: NA  Consults? (Pending/following, signed-off?) CC/SW  Where is patient from? (Home, TCU, etc.): [facility]  Other Important info for NEXT shift: Legally blind in L eye, [place]/ uses hearing aids. Hx of dementia. Attempting to find hospice care.  Anticipated DC date & active delays: TBD, awaiting hospice placement. SW following.  __________________________________________________________________________  SUMMARY NOTE:  Orientation/Cognitive: A&O x1, to self only. Calm and cooperative this shift. Incredibly [place], L hearing aid in this shift, R hearing aid was not in patient container. Pt still had very difficult time communicating even with the hearing aid.  Observation Goals (Met/ Not Met): jail  Mobility Level/Assist Equipment: A2 w/ lift, T/R q2hrs  Antibiotics & Plan (IV/po, length of tx left): NA  Pain Management: No complaints of pain made and no nonverbal signs indicated. Denies nausea.  Tele/VS/O2: VSS on RA  ABNL Lab/BG: NA  Diet: Pureed diet, thin liquids, no straws. Total assist for feeding. Appetite was fair. Needs upright position with PO intake. If coughing on thins or wet voice change to mildly thick liquids. Meds crushed with apple sauce.   Bowel/Bladder: Incontinent of B/B. No BM this shift.  Skin Concerns: Scattered bruising. Scab and blanchable redness on R elbow. Mepilex on both elbows.  Drains/Devices: No IV access, MD aware  Patient Stated Goal for Today: SANDRA

## 2024-10-14 NOTE — PLAN OF CARE
Speech Language Therapy Discharge Summary    Reason for therapy discharge:    Orders discontinued/hospice plan of care  No further expectations of functional progress.    Progress towards therapy goal(s). See goals on Care Plan in [facility] electronic health record for goal details.  NA orders discontinued  Swallow Tx 10/9: Diet advanced to puree and continue with thin liquids NO STRAWS. Supervision with eating and swallow strategies.   10/11: Unarousable    Therapy recommendation(s):    No further therapy is recommended.    Defer to MD/family/pt for changes to diet orders given hospice plan of care.

## 2024-10-14 NOTE — PROGRESS NOTES
[facility]  Hospitalist Progress Note  Jerson Rivera MD  10/13/2024    Assessment & Plan   Suleiman Spain is a markedly pleasant 89 year old gentleman with past medical history that is most notable for advanced dementia, who presents with acute generalized weakness and acute metabolic encephalopathy.     Generalized weakness   Acute delirium  Advanced dementia  -Patient presents with several days of generalized weakness with worsening agitation and delirium.  He was in the emergency room from 10//2024 and initially started care process was initiated, however he was admitted under observation for worsening weakness and delirium.  -On admission he was also found to be dehydrated   -no other reversible causes identified, UA unremarkable, chest x-ray with atelectasis, procalcitonin negative I do not favor this to be a pneumonia.  -Son and patient's long-term care facility felt that this was more of an abrupt decline although it appears like patient had ongoing symptoms of dementia for a while  -General Neurology consulted, they recommended home holding rivastigmine as this could have contributed to his recent decline  -Current plan is for patient to discharge to memory care unit, social work assisting.  I did offer hospice on discussion with the son on 10/6/2024, he would first want to try memory care and see how his dad did before considering hospice.  -family now considering hospice so possible hold on TCU and memory care discharge.  -hospice will evaluate patient on 10/11 morning at the hospital, will move towards hospice care likely at his current LTC where his spouse is versus new LTC.  - eats intermittently and inconsistently     Dysphagia  -Nursing staff have noticed concerns with cough with oral intake  -Patient was evaluated by SLP and they recommend higher level of care given severe cognitive deficit and ongoing dysphagia management.  They recommend full liquid diet with direct  staff supervision for feeding assisted.  They also recommend discontinuing feeding if signs and symptoms of aspiration or respiratory decline occurs  - patient seems to be refusing food and spitting it up back at nursing intermittently, did eat this morning.     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 .     Prior CVA  Hypertension  Hyperlipidemia  -continue aspirin, statin  -continue amlodipine     Diet: Pureed DD diet    DVT Prophylaxis: Pneumatic Compression Devices   Linder Catheter: Not present  Code Status: No CPR- Do NOT Intubate        Disposition Plan     Expected Discharge Date: 10/11/2024      Destination: to memory care  Discharge Comments: CM/RAI  speech rec higher level of care, supervision   pt from [facility] possible return with hospice         Disposition:   -- return to LTC with hospice    Interval History   -- currently in [facility] care  -- no acute issues     -Data reviewed today: I reviewed all new labs and imaging over the last 24 hours. I personally reviewed no images or EKG's today.    Physical Exam    , Blood pressure 126/66, pulse 78, temperature 99.3  F (37.4  C), temperature source Oral, resp. rate 16, SpO2 94%.  There were no vitals filed for this visit.  Vital Signs with Ranges  Temp:  [99.3  F (37.4  C)] 99.3  F (37.4  C)  Pulse:  [78] 78  Resp:  [16] 16  BP: (126)/(66) 126/66  SpO2:  [94 %] 94 %  I/O's Last 24 hours  I/O last 3 completed shifts:  In: 720 [P.O.:720]  Out: 30 [Urine:30]    Constitutional:  Sleeping but arouses, bilateral hearing aids  Respiratory: Clear to auscultation bilaterally, no crackles or wheezing  Cardiovascular: Regular rate and rhythm, normal S1 and S2, and no murmur noted  GI: Normal bowel sounds, soft, non-distended, non-tender  Skin/Integumen: No rashes, no cyanosis, cachexia  Other:      Medications   All medications were reviewed.  Current Facility-Administered Medications   Medication Dose Route Frequency Provider Last Rate Last Admin  "    Current Facility-Administered Medications   Medication Dose Route Frequency Provider Last Rate Last Admin    amLODIPine (NORVASC) tablet 5 mg  5 mg Oral Daily Jerson Rivera MD   5 mg at 10/13/24 0918    [START ON 10/16/2024] aspirin EC tablet 81 mg  81 mg Oral Weekly Jerson Rivera MD        bimatoprost (LUMIGAN) 0.01 % ophthalmic drops 1 drop  1 drop Left Eye At Bedtime Jerson Rivera MD        dorzolamide-timolol (COSOPT) ophthalmic solution 1 drop  1 drop Both Eyes BID Jerson Rivera MD   1 drop at 10/13/24 2032    multivitamin w/minerals (THERA-VIT-M) tablet 1 tablet  1 tablet Oral Daily Jerson Rivera MD   1 tablet at 10/13/24 0918    netarsudil (RHOPRESSA) 0.02 % ophthalmic solution 1 drop  1 drop Right Eye At Bedtime Jerson Rivera MD        rivastigmine (EXELON) capsule 1.5 mg  1.5 mg Oral BID Jerson Rivera MD   1.5 mg at 10/13/24 2032    rosuvastatin (CRESTOR) tablet 10 mg  10 mg Oral Daily Jerson Rivera MD   10 mg at 10/13/24 0918    Vitamin D3 (CHOLECALCIFEROL) tablet 25 mcg  25 mcg Oral Daily Jerson Rivera MD   25 mcg at 10/13/24 0918        Data   No lab results found in last 7 days.    Invalid input(s): \"TROP\", \"TROPONINIES\"      No results found for this or any previous visit (from the past 24 hour(s)).      Jerson Rivera MD  Text Page  (7am to 6pm)       "

## 2024-10-14 NOTE — PROGRESS NOTES
[facility]    Medicine Progress Note - Hospitalist Service    Date of Admission:  10/11/2024    Assessment & Plan   Suleiman Spain is a markedly pleasant 89 year old gentleman with past medical history that is most notable for advanced dementia, who presents with acute generalized weakness and acute metabolic encephalopathy.     He is in the hospital on [facility] care awaiting placement.      Generalized weakness   Acute delirium  Advanced dementia  -Patient presents with several days of generalized weakness with worsening agitation and delirium.  He was in the emergency room from 10//2024 and initially started care process was initiated, however he was admitted under observation for worsening weakness and delirium.  -On admission he was also found to be dehydrated   -no other reversible causes identified, UA unremarkable, chest x-ray with atelectasis, procalcitonin negative  -Son and patient's long-term care facility felt that this was more of an abrupt decline although it appears like patient had ongoing symptoms of dementia for a while  -General Neurology consulted, they recommended home holding rivastigmine as this could have contributed to his recent decline  -plan for discharge to LTC with or without hospice or to hospice home  - eats intermittently and inconsistently     Dysphagia  -Nursing staff have noticed concerns with cough with oral intake  -Patient was evaluated by SLP and they recommend higher level of care given severe cognitive deficit and ongoing dysphagia management.  They recommend full liquid diet with direct staff supervision for feeding assisted.  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 .     Prior CVA  Hypertension  Hyperlipidemia  -continue aspirin,  statin  -continue amlodipine          Diet: Combination Diet Pureed Diet (level 4); Thin Liquids (level 0)  Room Service    Linder Catheter: Not present  Lines: None     Cardiac Monitoring: None  Code Status: No CPR- Do NOT Intubate      Clinically Significant Risk Factors Present on Admission                 # Drug Induced Platelet Defect: home medication list includes an antiplatelet medication               # Financial/Environmental Concerns:           Disposition Plan     Medically Ready for Discharge: Ready Now             Shabbir Hawthorne MD  Hospitalist Service  [facility]  Securely message with [employer] (more info)  Text page via [facility] Paging/Directory   ______________________________________________________________________    Interval History   Seen in AM. Patient unable to answer questions. Trying to undress himself. Assisted nurse to shift back up in bed.   Discussed with SW.     Physical Exam   Vital Signs: Temp: 98.6  F (37  C) Temp src: Axillary BP: 139/72 Pulse: 77   Resp: 16 SpO2: 98 % O2 Device: None (Room air)    Weight: 0 lbs 0 oz    Lying in bed, appears comfortable.  Quite deaf, wearing hearing aids.  Laying in the foot of the bed but was able to assist patient and nurse to shift self back up to the bed.  Took off his gown while I was in the room and tried to take off his briefs.  He was redirectable when asked to keep his underwear on.      Medical Decision Making       30 MINUTES SPENT BY ME on the date of service doing chart review, history, exam, documentation & further activities per the note.      Data   ------------------------- PAST 24 HR DATA REVIEWED -----------------------------------------------        Imaging results reviewed over the past 24 hrs:   No results found for this or any previous visit (from the past 24 hour(s)).

## 2024-10-14 NOTE — PROGRESS NOTES
PRIMARY Concern: AMS, Generalized weakness  SAFETY RISK Concerns (fall risk, behaviors, etc.): Fall risk      Aggression Tool Color: Green  Isolation/Type: NA  Tests/Procedures for NEXT shift: NA  Consults? (Pending/following, signed-off?) CC/SW  Where is patient from? (Home, TCU, etc.): [facility]  Other Important info for NEXT shift: Legally blind in L eye, [place]/ uses hearing aids. Hx of dementia. Plan is to return back to [facility] with enrollment into [facility] and private duty care.   Anticipated DC date & active delays: TBD, awaiting hospice placement. SW following.  _______________________________________________________________________  SUMMARY NOTE:  Orientation/Cognitive: A&O to self only. Mostly calm and cooperative but occasionally restless and keeps taking off the gown.   Observation Goals (Met/ Not Met): longterm  Mobility Level/Assist Equipment: Total cares, T/R  Antibiotics & Plan (IV/po, length of tx left): NA  Pain Management: no s/sx of pain noted.   Tele/VS/O2: VSS on RA  ABNL Lab/BG: NA  Diet: Pureed, thin liquids, no straws. Total assist with feeding. Appetite poor. Needs upright position with PO intake . If coughing on thins or wet voice change to mildly thick liquids. Meds crushed with apple sauce ( Exelon capsule should not be opened).   Bowel/Bladder: Incontinent of B/B. At times requests for urinal and voids only in very small amount. Incontinent bladder X3. BM this shift.   Skin Concerns: Scattered bruising. Scab and blanchable redness on R elbow. Mepilex applied on bilateral elbows.   Drains/Devices: no IV  Patient Stated Goal for Today: SANDRA

## 2024-10-14 NOTE — PROGRESS NOTES
Care Management Follow Up    Length of Stay (days): 0    Expected Discharge Date: 10/14/2024     Concerns to be Addressed:       Patient plan of care discussed at interdisciplinary rounds: Yes    Anticipated Discharge Disposition:  (back to [facility] with private duty home care)    Anticipated Discharge Services:    Anticipated Discharge DME:      Patient/family educated on Medicare website which has current facility and service quality ratings:    Education Provided on the Discharge Plan:    Patient/Family in Agreement with the Plan:      Referrals Placed by CM/SW:    Private pay costs discussed: Not applicable    Discussed  Partnership in Safe Discharge Planning  document with patient/family: No     Handoff Completed: No, handoff not indicated or clinically appropriate    Additional Information:  Left VM for daughter Haley to check if she has looked into private pay agencies. Also called HUMZA Valdez with [facility] [phone] to update on status of discharge planning and left VM.    Addendum 1150: Received call back from Haley stating she wanted pt to go to [facility] to receive hospice care. Informed Haley that [facility] does not have their own hospice home, discussed residential hospice home options. Explained that [facility] is an agency that would come into wherever pt would be placed to provide the hospice cares. Haley would like to review the list of placement options with her brother and will follow up.  Briefly discussed LTC facilities as well as an option, Haley preferring a hospice home. Haley states private pay care back to [facility] will not be an option due to the cost. E-mailed the list to [email or web address].    Addendum 2782: Haley called and said she has reached out to places but hasn't heard back yet due to the holiday. Haley is looking into [facility], but also some LTC facilities such as [facility]. Haley in agreement to send referral to [facility]. Discussed private pay costs  for residential hospice homes and LTC, and both being comparable. Haley's priority is getting patient to a facility close to spouse in [place].    CHANA Strong  Social Work  [facility]

## 2024-10-14 NOTE — PROGRESS NOTES
PRIMARY Concern: AMS, Generalized weakness  SAFETY RISK Concerns (fall risk, behaviors, etc.): Fall risk      Aggression Tool Color: Green  Isolation/Type: NA  Tests/Procedures for NEXT shift: NA  Consults? (Pending/following, signed-off?) CC/SW  Where is patient from? (Home, TCU, etc.): [facility]  Other Important info for NEXT shift: Legally blind in L eye, [place]/ uses hearing aids. Hx of dementia. Plan is to return back to [facility] with enrollment into [facility] vs LTC w/ Hospice care.   Anticipated DC date & active delays: TBD, awaiting hospice placement. SW following.  ___________________________________________  SUMMARY NOTE:  Orientation/Cognitive: A&O to self only. Mostly calm and cooperative but occasionally restless and keeps taking off the gown.   Observation Goals (Met/ Not Met): [facility]  Mobility Level/Assist Equipment: A1/GB/W.T/R  Antibiotics & Plan (IV/po, length of tx left): NA  Pain Management: no s/sx of pain noted.   Tele/VS/O2: VSS on RA  ABNL Lab/BG: NA  Diet: Pureed, thin liquids, no straws. Total assist with feeding.Needs upright position with PO intake . If coughing on thins or wet voice change to mildly thick liquids. Meds crushed with apple sauce ( Exelon capsule should not be opened).   Bowel/Bladder: Incontinent of B/B. At times requests for urinal and voids only in very small amount.BM this shift.   Skin Concerns: Scattered bruising. Scab and blanchable redness on bilat  elbows. Mepilex applied on bilateral elbows.   Drains/Devices: no IV  Patient Stated Goal for Today: SANDRA

## 2024-10-15 NOTE — PROGRESS NOTES
Care Management Follow Up    Length of Stay (days): 0    Expected Discharge Date: 10/16/2024     Concerns to be Addressed:       Patient plan of care discussed at interdisciplinary rounds: Yes    Anticipated Discharge Disposition:    Anticipated Discharge Services:    Anticipated Discharge DME:      Patient/family educated on Medicare website which has current facility and service quality ratings:    Education Provided on the Discharge Plan:    Patient/Family in Agreement with the Plan:      Referrals Placed by CM/SW:    Private pay costs discussed: Not applicable    Discussed  Partnership in Safe Discharge Planning  document with patient/family: No     Handoff Completed: No, handoff not indicated or clinically appropriate    Additional Information:  [facility] called, RNCC and SW both spoke with Yesenia ([phone]) at the same time. Yesenia states they can accept patient back, they are going to have patient in the memory care in a separate room from pt's spouse. They have a call out to annie De Leon and will wait to hear back from her. They want to make placement with them work and would like to accommodate so family can have both parents at same facility. They do not state 24/7 care is a requirement any longer, they are aware that travel RN Tabitha stated pt could not return previously but are insisting pt now can. They are in contact with [facility] as well and know that equipment would need to be delivered. They anticipate discharge could happen tomorrow, pending daughter's agreement for pt to return to them. They will call RNCC/SW back once they hear from daughter. SW stated it is important they talk to daughter directly since we have all been getting mixed messages from them.    [facility] also accepted patient for placement. They require a $5,000 deposit.     Left VM for annie De Leon on home phone. Haley returned call, updated on information above. Haley would like pt to return to  [facility] and is going to call Yesenia to discuss, she will then call  back with update.    Addendum 1517: Spoke with Haley, who stated it's confirmed we can move forward with discharging pt back to [facility]. Pt will need transport arranged. Haley states she can move furniture in the morning but not tonight.   Spoke with Barbara at [facility] ([phone]), they will deliver DME and Camilla with [facility] will work with [facility] on this. Barbara would like an early afternoon transport arranged, between [id number]. Scheduled  Health stretcher ride for [phone]. PCS completed and faxed. Haley Jimenez, and [facility] are aware of ride time.   Messaged hospitalist and requested 3 days of comfort meds to be filled, no ranges, and bubble packed. Discharge orders need to faxed to [facility] via [facility] as well as [facility] at [phone].    CHANA Strong  Social Work  [facility]

## 2024-10-15 NOTE — PROGRESS NOTES
[facility]    Medicine Progress Note - Hospitalist Service    Date of Admission:  10/11/2024    Assessment & Plan   Suleiman Spain is a markedly pleasant 89 year old gentleman with past medical history that is most notable for advanced dementia, who presents with acute generalized weakness and acute metabolic encephalopathy.     He is in the hospital on senior living care awaiting placement.      Generalized weakness   Acute delirium  Advanced dementia  -Patient presents with several days of generalized weakness with worsening agitation and delirium.  He was in the emergency room from 10//2024 and initially started care process was initiated, however he was admitted under observation for worsening weakness and delirium.  -On admission he was also found to be dehydrated   -no other reversible causes identified, UA unremarkable, chest x-ray with atelectasis, procalcitonin negative  -Son and patient's long-term care facility felt that this was more of an abrupt decline although it appears like patient had ongoing symptoms of dementia for a while  -General Neurology consulted, they recommended home holding rivastigmine as this could have contributed to his recent decline  -plan for discharge to LTC with or without hospice or to hospice home  - eats intermittently and inconsistently     Dysphagia  -Nursing staff have noticed concerns with cough with oral intake  -Patient was evaluated by SLP and they recommend higher level of care given severe cognitive deficit and ongoing dysphagia management.  They recommend full liquid diet with direct staff supervision for feeding assisted.  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 .     Prior CVA  Hypertension  Hyperlipidemia  -continue aspirin,  statin  -continue amlodipine          Diet: Combination Diet Pureed Diet (level 4); Thin Liquids (level 0)  Room Service  Diet    Linder Catheter: Not present  Lines: None     Cardiac Monitoring: None  Code Status: No CPR- Do NOT Intubate      Clinically Significant Risk Factors Present on Admission                 # Drug Induced Platelet Defect: home medication list includes an antiplatelet medication               # Financial/Environmental Concerns:           Disposition Plan     Medically Ready for Discharge: Ready Now             Shabbir Hawthorne MD  Hospitalist Service  [facility]  Securely message with [employer] (more info)  Text page via [employer]/Directory   ______________________________________________________________________    Interval History   Seen in AM. Unable to answer questions. Grumbling at me.   Discussed with RN. No concerns.     Physical Exam   Vital Signs:                    Weight: 0 lbs 0 oz    Lying in bed.  Appears comfortable.  Regular breaths, no increased work of breathing.  No cyanosis.  Abdomen not distended.  Wearing depends briefs and an orange T-shirt.  No skin mottling or rash on exposed skin.      Medical Decision Making       25 MINUTES SPENT BY ME on the date of service doing chart review, history, exam, documentation & further activities per the note.      Data   ------------------------- PAST 24 HR DATA REVIEWED -----------------------------------------------        Imaging results reviewed over the past 24 hrs:   No results found for this or any previous visit (from the past 24 hour(s)).

## 2024-10-15 NOTE — PROGRESS NOTES
No changes overnight. Intermittent agitation when not sleeping, yelling out, throwing things. Speech is usually incoherent. Incontinent of urine. Accepting sips of water, and bites of applesauce. Repositioning self in bed. Under [facility]DC care, awaiting placement.

## 2024-10-15 NOTE — PROGRESS NOTES
10/14/24   [id number]  PRIMARY Concern: AMS, Generalized weakness  SAFETY RISK Concerns (fall risk, behaviors, etc.): Fall risk      Aggression Tool Color: Green  Isolation/Type: NA  Tests/Procedures for NEXT shift: NA  Consults? (Pending/following, signed-off?) CC/SW   Where is patient from? (Home, TCU, etc.): [place] [facility]  Other Important info for NEXT shift: Legally blind in L eye, [place]/ uses hearing aids. Hx of dementia. Plan is to return back to [place] with enrollment into [facility] vs LTC w/ Hospice care.   Anticipated DC date & active delays: TBD, awaiting hospice placement. SW following.  ____________________________________  SUMMARY NOTE:  Orientation/Cognitive: A&O to self only. Mostly calm and cooperative but occasionally restless and keeps taking off the gown.   Observation Goals (Met/ Not Met): long-term  Mobility Level/Assist Equipment: A1/GB/W.T/R  Antibiotics & Plan (IV/po, length of tx left): NA  Pain Management: no s/sx of pain noted.   Tele/VS/O2: VSS on RA  ABNL Lab/BG: NA  Diet: Pureed, thin liquids, no straws. Total assist with feeding.Needs upright position with PO intake. If coughing on thins or wet voice change to mildly thick liquids. Meds crushed with apple sauce ( Exelon capsule should not be opened).   Bowel/Bladder: Incontinent of B/B. At times requests for urinal and voids only in very small amount.   Skin Concerns: Scattered bruising. Scab and blanchable redness on bilat  elbows. Mepilex applied on bilateral elbows.   Drains/Devices: no IV  Patient Stated Goal for Today: SANDRA

## 2024-10-15 NOTE — PLAN OF CARE
Writer assumed care of patient [id number] 10/15/24   PRIMARY Concern: AMS, Generalized weakness   Tests/Procedures for NEXT shift: none  Consults? (Pending/following, signed-off?): none  Safety Risk CONCERNS (fall risk, behaviors, etc.): fall risk      Where is patient from? 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 Hx of dementia. Plan is to return back to [place] with enrollment into [facility] vs LTC w/ Hospice care.   Anticipated DC date & active delays: [facility] for [id number] for 10/16/24 [place] [facility]   SUMMARY NOTE:  Orientation/Cognitive: A/O to self only. Calm and cooperative but occasionally restless, removes clothes    Observation Goals (Met/ Not Met): intermediate   Mobility Level/Assist Equipment:   Antibiotics & Plan (IV/po, length of tx left): none   Pain Management: denies pain  Tele/VS/O2: VSS on room air   ABNL Lab/BG: N/A  Diet: Pureed, thin liquids, no straws. Total assist with feeding.Needs upright position with PO intake. If coughing on thins or wet voice change to mildly thick liquids. Meds crushed with apple sauce   Bowel/Bladder: incont of b/B  Skin Concerns: Scattered bruising. Scab and blanchable redness on bilat elbows. Mepilex applied on bilateral elbows.   Drains/Devices: no IV   Patient Stated Goal for Today: none

## 2024-10-16 NOTE — PLAN OF CARE
Goal Outcome Evaluation:      Plan of Care Reviewed With: patient        Pt alert. Disoriented X4. Garbled speech. Incoherent. VSS. RA. Up with lift. Incontinence.  Total feed. Dysphagia diet. Discharge today to [facility].

## 2024-10-16 NOTE — PROGRESS NOTES
Care Management Discharge Note    Discharge Date: 10/16/2024       Discharge Disposition:  (back to [facility] with private duty home care)    Discharge Services:  Hopsice    Discharge DME:  None    [facility]    Private pay costs discussed: Not applicable    Does the patient's insurance plan have a 3 day qualifying hospital stay waiver?  No    PAS Confirmation Code:  Not Applicable  Patient/family educated on Medicare website which has current facility and service quality ratings:  No    Education Provided on the Discharge Plan:  Yes  Persons Notified of Discharge Plans: [facility], Hospice, Bedside Nurse, Charge Nurse, MD, Patient, family  Patient/Family in Agreement with the Plan:  Yes    Handoff Referral Completed: No, handoff not indicated or clinically appropriate    Additional Information:  Patient will be discharging to [facility] with memory care and hospice. Patient is going back to the [facility] on [facility]. Stretcher transport has been set up for patient to discharge to [facility]. Time is between 12:40 and 13:20. Orders have been faxed to Hospice and [facility]. Scripts will be sent with the patient. No PAS is needed as patient is going to [facility]. Patient will discharge today to his Memory Care [facility].     Garrett Stout MSW, TLGSW  [facility]  Care Management

## 2024-10-16 NOTE — DISCHARGE SUMMARY
[facility]  Hospitalist Discharge Summary      Date of Admission:  10/11/2024  Date of Discharge:  10/16/2024  Discharging Provider: Eusebio Mason MD  Discharge Service: Hospitalist Service    Discharge Diagnoses   Hospice   Generalized weakness  Acute delirium  Advanced dementia  Dysphagia  Legal blindness in left eye  Prior CVA  Hypertension  Hyperlipidemia      Clinically Significant Risk Factors          Follow-ups Needed After Discharge   Follow-up Appointments     Follow-up and recommended labs and tests       Follow up with hospice team after discharge.            Unresulted Labs Ordered in the Past 30 Days of this Admission       No orders found from 9/11/2024 to 10/12/2024.        These results will be followed up by NA    Discharge Disposition   Discharged to memory care with hospice  Condition at discharge: Stable    Hospital Course   Suleiman Spain is a markedly pleasant 89 year old gentleman with past medical history that is most notable for advanced dementia, who presents with acute generalized weakness and acute metabolic encephalopathy.     He is in the hospital on [facility] care awaiting placement.      Generalized weakness   Acute delirium  Advanced dementia  *Patient presents with several days of generalized weakness with worsening agitation and delirium.  He was in the emergency room from 10//2024 and initially started care process was initiated, however he was admitted under observation for worsening weakness and delirium.  - On admission he was also found to be dehydrated   - no other reversible causes identified, UA unremarkable, chest x-ray with atelectasis, procalcitonin negative  - Son and patient's long-term care facility felt that this was more of an abrupt decline although it appears like patient had ongoing symptoms of dementia for a while  - General Neurology consulted, they recommended home holding rivastigmine as this could have contributed to his  recent decline  - eats intermittently and inconsistently  - plan for discharge to LTC/memory care with hospice services     Dysphagia  - Nursing staff have noticed concerns with cough with oral intake  - Patient was evaluated by SLP and they recommend higher level of care given severe cognitive deficit and ongoing dysphagia management.  They recommend full liquid diet with direct staff supervision for feeding assisted.  They also recommend discontinuing feeding if signs and symptoms of aspiration or respiratory decline occurs  - patient seems to be refusing food and spitting it up back at nursing intermittently     Legal blindness in left eye  - Eye drops continued.      Prior CVA  Hypertension  Hyperlipidemia  - continue aspirin, statin  - continue amlodipine    Consultations This Hospital Stay   CARE MANAGEMENT / SOCIAL WORK IP CONSULT  PHARMACY IP CONSULT    Code Status   No CPR- Do NOT Intubate    Time Spent on this Encounter   I, Eusebio Mason MD, personally saw the patient today and spent greater than 30 minutes discharging this patient.       Eusebio Mason MD  [facility] EXTENDED RECOVERY AND SHORT STAY  [address] [place] [id number]  Phone: [phone]  ______________________________________________________________________    Physical Exam   Vital Signs:                    Weight: 0 lbs 0 oz    GEN: NAD, elderly, hard of hearing  HEENT: mmm  PULM: CTABL  ABDO: nondistended         Primary Care Physician   Irvin Oliva    Discharge Orders      Reason for your hospital stay    You were in the hospital to coordinate placement.     Activity    Your activity upon discharge: activity as tolerated     Follow-up and recommended labs and tests     Follow up with hospice team after discharge.     Discharge Instructions    Admit to memory care.     Diet    Follow this diet upon discharge:       Combination Diet Pureed Diet (level 4); Thin Liquids (level 0)       Significant  Results and Procedures   Most Recent 3 CBC's:  Recent Labs   Lab Test 10/04/24  1247 11/03/22  1832   WBC 8.0 9.5   HGB 14.8 15.1   MCV 93 92    222     Most Recent 3 BMP's:  Recent Labs   Lab Test 10/04/24  1247 11/03/22  1832    140   POTASSIUM 4.0 3.8   CHLORIDE 108* 110*   CO2 24 24   BUN 15.4 27   CR 0.79 0.92   ANIONGAP 9 6   SMILEY 8.1* 8.9   GLC 93 106*   ,   Results for orders placed or performed during the hospital encounter of 10/05/24   XR Chest Port 1 View    Narrative    XR CHEST PORT 1 VIEW 10/8/2024 8:51 AM    HISTORY: cough    COMPARISON: None.      Impression    IMPRESSION: Mild left basilar atelectasis/consolidation and trace left  pleural effusion.  No pneumothorax. Mild cardiomegaly.    GILBERTO DODD MD         SYSTEM ID:  [id number]   MR Brain w/o Contrast    Narrative    EXAM: MR BRAIN W/O CONTRAST  LOCATION: [facility]  DATE: 10/8/2024    INDICATION: weakness, confusion  COMPARISON: Head CT 10/4/2024  TECHNIQUE: Routine multiplanar multisequence head MRI without intravenous contrast.    FINDINGS:  INTRACRANIAL CONTENTS: No acute or subacute infarct. No mass, acute hemorrhage, or extra-axial fluid collections. Patchy and confluent nonspecific T2/FLAIR hyperintensities within the cerebral white matter most consistent with moderate chronic   microvascular ischemic change. Chronic lacunar infarcts bilateral corona radiata. Moderate generalized cerebral atrophy. No hydrocephalus. Normal position of the cerebellar tonsils.     SELLA: No abnormality accounting for technique.    OSSEOUS STRUCTURES/SOFT TISSUES: Normal marrow signal. The major intracranial vascular flow voids are maintained.     ORBITS: No abnormality accounting for technique.     SINUSES/MASTOIDS: No paranasal sinus mucosal disease. Scattered fluid/membrane thickening in the right mastoid air cells. No apparent mass in the posterior nasopharynx or skull base.       Impression    IMPRESSION:  1.  No  acute findings.  2.  Age-related changes with chronic lacunar infarcts in the bilateral corona radiata.         Discharge Medications   Current Discharge Medication List        START taking these medications    Details   !! acetaminophen (TYLENOL) 500 MG tablet Take 1 tablet (500 mg) by mouth every 6 hours as needed for mild pain.  Qty: 10 tablet, Refills: 0    Comments: Please bubble pack  Associated Diagnoses: End of life care      acetaminophen (TYLENOL) 650 MG suppository Place 1 suppository (650 mg) rectally every 4 hours as needed for fever (if unable to tolerate PO).  Qty: 1 suppository, Refills: 0    Comments: Please bubble pack  Associated Diagnoses: End of life care      LORazepam (ATIVAN) 0.5 MG tablet Take 1 tablet (0.5 mg) by mouth every 6 hours as needed for anxiety, nausea, sleep, vomiting or agitation.  Qty: 8 tablet, Refills: 0    Comments: Please bubble pack  Associated Diagnoses: End of life care      oxyCODONE (ROXICODONE) 5 MG tablet Take 1 tablet (5 mg) by mouth every 6 hours as needed for pain.  Qty: 12 tablet, Refills: 0    Comments: Please bubble pack  Associated Diagnoses: End of life care      senna (SENOKOT) 8.6 MG tablet Take 1 tablet by mouth 2 times daily as needed for constipation.  Qty: 6 tablet, Refills: 0    Comments: Please bubble pack  Associated Diagnoses: End of life care       !! - Potential duplicate medications found. Please discuss with provider.        CONTINUE these medications which have NOT CHANGED    Details   !! acetaminophen (TYLENOL) 500 MG tablet Take 2 tablets (1,000 mg) by mouth every 6 hours as needed for pain    Associated Diagnoses: Hip pain, left      amLODIPine (NORVASC) 5 MG tablet Take 5 mg by mouth daily      aspirin 81 MG EC tablet Take 81 mg by mouth. ON WEDNESDAYS      bimatoprost (LUMIGAN) 0.01 % SOLN Place 1 drop Into the left eye At Bedtime      calcium carbonate (TUMS) 500 MG chewable tablet Take 2 chew tab by mouth every 6 hours as needed for  heartburn.      carboxymethylcellulose PF (REFRESH PLUS) 0.5 % ophthalmic solution Place 1-2 drops into both eyes 4 times daily as needed for dry eyes.      dorzolamide-timolol (COSOPT) 2-0.5 % ophthalmic solution Place 1 drop into both eyes 2 times daily      Multiple Vitamins-Minerals (MULTIVITAMIN MEN 50+) TABS Take 1 tablet by mouth daily.      netarsudil (RHOPRESSA) 0.02 % ophthalmic solution Place 1 drop into the right eye At Bedtime      QUEtiapine (SEROQUEL) 25 MG tablet Take 25 mg by mouth every 6 hours as needed (AGITATION).      rivastigmine (EXELON) 1.5 MG capsule Take 1.5 mg by mouth 2 times daily.      rosuvastatin (CRESTOR) 10 MG tablet Take 10 mg by mouth daily      Vitamin D, Cholecalciferol, 25 MCG (1000 UT) CAPS Take 1 capsule by mouth daily.       !! - Potential duplicate medications found. Please discuss with provider.        Allergies   Allergies   Allergen Reactions    Iodinated Contrast Media Hives     DYE  DYE      Aspirin Buffered Unknown and GI Disturbance    Brimonidine Other (See Comments)     stomach ache, head ache, sore muscles  Eye burning      Fd&C Blue #2 (Indigotine) Hives    Ibuprofen Unknown     Stomach ulcers and bleeding      Aspirin GI Disturbance and Itching     Gi bleed; not an allergy  Okay for low dose asa 81mg M W F  Gi bleed; not an allergy  Okay for low dose asa 81mg M W F      Erythromycin Rash    Penicillins Rash

## 2024-10-16 NOTE — PLAN OF CARE
PRIMARY Concern: AMS, Generalized weakness   Tests/Procedures for NEXT shift: none  Consults? (Pending/following, signed-off?): none  Safety Risk CONCERNS (fall risk, behaviors, etc.): fall risk      Where is patient from? 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 Hx of dementia. Plan is to return back to [place] @1400 with enrollment into [facility] vs LTC w/ Hospice care.   Anticipated DC date & active delays: [facility] stret[facility] ride for [id number] for 10/16/24 [place] [facility]   SUMMARY NOTE:  Orientation/Cognitive: A/O to self only. Calm and cooperative but occasionally restless, removes clothes    Observation Goals (Met/ Not Met): FDC   Mobility Level/Assist Equipment:   Antibiotics & Plan (IV/po, length of tx left): none   Pain Management: denies pain  Tele/VS/O2: VSS on room air   ABNL Lab/BG: N/A  Diet: Pureed, thin liquids, no straws. Total assist with feeding.Needs upright position with PO intake. If coughing on thins or wet voice change to mildly thick liquids. Meds crushed with apple sauce   Bowel/Bladder: incont of b/B  Skin Concerns: Scattered bruising. Scab and blanchable redness on bilat elbows. Mepilex applied on bilateral elbows.   Drains/Devices: no IV   Patient Stated Goal for Today: none

## 2024-10-17 NOTE — PROGRESS NOTES
[facility]: [facility]    Background: Transitional Care Management program identified per system criteria and reviewed by Connected Care Resource Center team for possible outreach.    Assessment: Upon chart review, CCR Team member will not proceed with patient outreach related to this episode of Transitional Care Management program due to reason below:    Patient has been discharged with Hospice Care    Plan: Transitional Care Management episode addressed appropriately per reason noted above.      CHANA Falk  [facility]    *Connected Care Resource Team does NOT follow patient ongoing. Referrals are identified based on internal discharge reports and the outreach is to ensure patient has an understanding of their discharge instructions.